# Patient Record
Sex: FEMALE | Race: WHITE | NOT HISPANIC OR LATINO | Employment: UNEMPLOYED | URBAN - METROPOLITAN AREA
[De-identification: names, ages, dates, MRNs, and addresses within clinical notes are randomized per-mention and may not be internally consistent; named-entity substitution may affect disease eponyms.]

---

## 2018-07-31 ENCOUNTER — HOSPITAL ENCOUNTER (EMERGENCY)
Facility: HOSPITAL | Age: 28
Discharge: HOME | End: 2018-07-31
Attending: EMERGENCY MEDICINE | Admitting: EMERGENCY MEDICINE

## 2018-07-31 VITALS
RESPIRATION RATE: 16 BRPM | TEMPERATURE: 98.3 F | HEIGHT: 64 IN | DIASTOLIC BLOOD PRESSURE: 75 MMHG | OXYGEN SATURATION: 98 % | SYSTOLIC BLOOD PRESSURE: 121 MMHG | HEART RATE: 87 BPM | WEIGHT: 149.5 LBS | BODY MASS INDEX: 25.52 KG/M2

## 2018-07-31 DIAGNOSIS — R19.7 NAUSEA VOMITING AND DIARRHEA: Primary | ICD-10-CM

## 2018-07-31 DIAGNOSIS — R11.2 NAUSEA VOMITING AND DIARRHEA: Primary | ICD-10-CM

## 2018-07-31 DIAGNOSIS — E86.0 DEHYDRATION: ICD-10-CM

## 2018-07-31 LAB
ALBUMIN SERPL-MCNC: 4.3 G/DL (ref 3.5–5.2)
ALBUMIN/GLOB SERPL: 1.4 G/DL
ALP SERPL-CCNC: 52 U/L (ref 39–117)
ALT SERPL W P-5'-P-CCNC: 16 U/L (ref 1–33)
ANION GAP SERPL CALCULATED.3IONS-SCNC: 14.9 MMOL/L
AST SERPL-CCNC: 16 U/L (ref 1–32)
BACTERIA UR QL AUTO: ABNORMAL /HPF
BASOPHILS # BLD AUTO: 0.01 10*3/MM3 (ref 0–0.2)
BASOPHILS NFR BLD AUTO: 0.1 % (ref 0–1.5)
BILIRUB SERPL-MCNC: 0.4 MG/DL (ref 0.1–1.2)
BILIRUB UR QL STRIP: NEGATIVE
BUN BLD-MCNC: 11 MG/DL (ref 6–20)
BUN/CREAT SERPL: 11.3 (ref 7–25)
CALCIUM SPEC-SCNC: 8.5 MG/DL (ref 8.6–10.5)
CHLORIDE SERPL-SCNC: 101 MMOL/L (ref 98–107)
CLARITY UR: ABNORMAL
CO2 SERPL-SCNC: 22.1 MMOL/L (ref 22–29)
COLOR UR: ABNORMAL
CREAT BLD-MCNC: 0.97 MG/DL (ref 0.57–1)
D-LACTATE SERPL-SCNC: 1.3 MMOL/L (ref 0.5–2)
DEPRECATED RDW RBC AUTO: 41.2 FL (ref 37–54)
EOSINOPHIL # BLD AUTO: 0 10*3/MM3 (ref 0–0.7)
EOSINOPHIL NFR BLD AUTO: 0 % (ref 0.3–6.2)
ERYTHROCYTE [DISTWIDTH] IN BLOOD BY AUTOMATED COUNT: 13 % (ref 11.7–13)
GFR SERPL CREATININE-BSD FRML MDRD: 69 ML/MIN/1.73
GLOBULIN UR ELPH-MCNC: 3.1 GM/DL
GLUCOSE BLD-MCNC: 118 MG/DL (ref 65–99)
GLUCOSE UR STRIP-MCNC: NEGATIVE MG/DL
HCT VFR BLD AUTO: 44.2 % (ref 35.6–45.5)
HGB BLD-MCNC: 14.3 G/DL (ref 11.9–15.5)
HGB UR QL STRIP.AUTO: ABNORMAL
HYALINE CASTS UR QL AUTO: ABNORMAL /LPF
IMM GRANULOCYTES # BLD: 0.02 10*3/MM3 (ref 0–0.03)
IMM GRANULOCYTES NFR BLD: 0.2 % (ref 0–0.5)
KETONES UR QL STRIP: ABNORMAL
LEUKOCYTE ESTERASE UR QL STRIP.AUTO: ABNORMAL
LYMPHOCYTES # BLD AUTO: 0.8 10*3/MM3 (ref 0.9–4.8)
LYMPHOCYTES NFR BLD AUTO: 9.4 % (ref 19.6–45.3)
MCH RBC QN AUTO: 28.3 PG (ref 26.9–32)
MCHC RBC AUTO-ENTMCNC: 32.4 G/DL (ref 32.4–36.3)
MCV RBC AUTO: 87.4 FL (ref 80.5–98.2)
MONOCYTES # BLD AUTO: 0.21 10*3/MM3 (ref 0.2–1.2)
MONOCYTES NFR BLD AUTO: 2.5 % (ref 5–12)
MUCOUS THREADS URNS QL MICRO: ABNORMAL /HPF
NEUTROPHILS # BLD AUTO: 7.45 10*3/MM3 (ref 1.9–8.1)
NEUTROPHILS NFR BLD AUTO: 87.8 % (ref 42.7–76)
NITRITE UR QL STRIP: NEGATIVE
PH UR STRIP.AUTO: 6.5 [PH] (ref 5–8)
PLATELET # BLD AUTO: 177 10*3/MM3 (ref 140–500)
PMV BLD AUTO: 9.9 FL (ref 6–12)
POTASSIUM BLD-SCNC: 3.9 MMOL/L (ref 3.5–5.2)
PROT SERPL-MCNC: 7.4 G/DL (ref 6–8.5)
PROT UR QL STRIP: ABNORMAL
RBC # BLD AUTO: 5.06 10*6/MM3 (ref 3.9–5.2)
RBC # UR: ABNORMAL /HPF
REF LAB TEST METHOD: ABNORMAL
SODIUM BLD-SCNC: 138 MMOL/L (ref 136–145)
SP GR UR STRIP: >=1.03 (ref 1–1.03)
SQUAMOUS #/AREA URNS HPF: ABNORMAL /HPF
UROBILINOGEN UR QL STRIP: ABNORMAL
WBC NRBC COR # BLD: 8.49 10*3/MM3 (ref 4.5–10.7)
WBC UR QL AUTO: ABNORMAL /HPF

## 2018-07-31 PROCEDURE — 81001 URINALYSIS AUTO W/SCOPE: CPT | Performed by: EMERGENCY MEDICINE

## 2018-07-31 PROCEDURE — 87040 BLOOD CULTURE FOR BACTERIA: CPT | Performed by: EMERGENCY MEDICINE

## 2018-07-31 PROCEDURE — 96360 HYDRATION IV INFUSION INIT: CPT

## 2018-07-31 PROCEDURE — 85025 COMPLETE CBC W/AUTO DIFF WBC: CPT | Performed by: EMERGENCY MEDICINE

## 2018-07-31 PROCEDURE — 99284 EMERGENCY DEPT VISIT MOD MDM: CPT

## 2018-07-31 PROCEDURE — 80053 COMPREHEN METABOLIC PANEL: CPT | Performed by: EMERGENCY MEDICINE

## 2018-07-31 PROCEDURE — 83605 ASSAY OF LACTIC ACID: CPT | Performed by: EMERGENCY MEDICINE

## 2018-07-31 RX ORDER — PROMETHAZINE HYDROCHLORIDE 25 MG/1
25 TABLET ORAL EVERY 6 HOURS PRN
Qty: 20 TABLET | Refills: 0 | Status: SHIPPED | OUTPATIENT
Start: 2018-07-31 | End: 2020-11-06

## 2018-07-31 RX ORDER — NORGESTIMATE AND ETHINYL ESTRADIOL 0.25-0.035
1 KIT ORAL DAILY
COMMUNITY
End: 2020-11-06

## 2018-07-31 RX ORDER — ACETAMINOPHEN, ASPIRIN AND CAFFEINE 250; 250; 65 MG/1; MG/1; MG/1
2 TABLET, FILM COATED ORAL EVERY 6 HOURS PRN
COMMUNITY
End: 2020-11-06

## 2018-07-31 RX ADMIN — SODIUM CHLORIDE 2034 ML: 9 INJECTION, SOLUTION INTRAVENOUS at 04:04

## 2018-08-05 LAB
BACTERIA SPEC AEROBE CULT: NORMAL
BACTERIA SPEC AEROBE CULT: NORMAL

## 2018-08-07 ENCOUNTER — OFFICE VISIT (OUTPATIENT)
Dept: FAMILY MEDICINE CLINIC | Facility: CLINIC | Age: 28
End: 2018-08-07

## 2018-08-07 VITALS
SYSTOLIC BLOOD PRESSURE: 120 MMHG | BODY MASS INDEX: 27.11 KG/M2 | DIASTOLIC BLOOD PRESSURE: 74 MMHG | HEART RATE: 86 BPM | WEIGHT: 158.8 LBS | TEMPERATURE: 98.2 F | HEIGHT: 64 IN | OXYGEN SATURATION: 96 %

## 2018-08-07 DIAGNOSIS — K59.00 CONSTIPATION, UNSPECIFIED CONSTIPATION TYPE: Primary | ICD-10-CM

## 2018-08-07 PROCEDURE — 99204 OFFICE O/P NEW MOD 45 MIN: CPT | Performed by: NURSE PRACTITIONER

## 2018-08-07 RX ORDER — ONDANSETRON 4 MG/1
TABLET, FILM COATED ORAL AS NEEDED
Refills: 0 | COMMUNITY
Start: 2018-07-30 | End: 2020-11-06

## 2018-08-07 NOTE — PROGRESS NOTES
Subjective   Angelica Pinon is a 28 y.o. female.     Chief Complaint   Patient presents with   • Abdominal Pain     NP- intermittent over months   • Nausea   • Diarrhea     x 5 days   • Constipation     After diarrhea is now constipation     Ms Pinon presents today to establish care at this practice and to follow up on a recent ER visit. She was seen at [facility] in the ER on 7/31/18 for diarrhea and vomiting. The vomiting and nausea started 2 days prior to her ER visit and the diarrhea started the day of her ER visit. She also reported a fever at that time. She was treated for viral infection and dehydration and released on promethazine or Zofran for the vomiting. She reports that since that visit she has been constipated. She reports that she has had intermittent abdominal discomfort with diarrhea over the past few months. There is no nausea or vomiting associated with the discomfort and it only last for 1 to 2 days. She states her mother has her concerned because she has a sister with ulcerative colitis since childhood and this has cause her sister many health issues.    I have reviewed the patient's medical history in detail and updated the computerized patient record.     The following portions of the patient's history were reviewed and updated as appropriate: allergies, current medications, past family history, past medical history, past social history, past surgical history and problem list.       Current Outpatient Prescriptions:   •  aspirin-acetaminophen-caffeine (EXCEDRIN MIGRAINE) 250-250-65 MG per tablet, Take 2 tablets by mouth Every 6 (Six) Hours As Needed for Headache., Disp: , Rfl:   •  norgestimate-ethinyl estradiol (ESTARYLLA) 0.25-35 MG-MCG per tablet, Take 1 tablet by mouth Daily., Disp: , Rfl:   •  ondansetron (ZOFRAN) 4 MG tablet, As Needed., Disp: , Rfl: 0  •  promethazine (PHENERGAN) 25 MG tablet, Take 1 tablet by mouth Every 6 (Six) Hours As Needed for Nausea., Disp: 20 tablet, Rfl: 0    Review of  Systems   Constitutional: Negative.    HENT: Negative.    Eyes: Negative.    Respiratory: Negative.    Cardiovascular: Negative.    Gastrointestinal: Positive for abdominal pain (intermittent ), constipation (now is constipated ), diarrhea (last 5 days, last week), nausea (last week) and vomiting (last week). Negative for abdominal distention.   Skin: Negative.    Neurological: Negative.        Objective    Vitals:    08/07/18 0909   BP: 120/74   Pulse: 86   Temp: 98.2 °F (36.8 °C)   SpO2: 96%     Physical Exam   Constitutional: She is oriented to person, place, and time. She appears well-developed and well-nourished.   HENT:   Head: Normocephalic.   Right Ear: External ear normal.   Left Ear: External ear normal.   Mouth/Throat: Oropharynx is clear and moist.   Neck: Normal range of motion. Neck supple. No JVD present. No tracheal deviation present. No thyromegaly present.   Cardiovascular: Normal rate, regular rhythm, normal heart sounds and intact distal pulses.    Pulmonary/Chest: Effort normal and breath sounds normal.   Abdominal: Soft. Normal appearance. She exhibits no distension and no mass. Bowel sounds are decreased. There is no splenomegaly or hepatomegaly. There is no tenderness. There is no rebound, no guarding, no tenderness at McBurney's point and negative Cantu's sign.   Musculoskeletal: Normal range of motion. She exhibits no edema.   Lymphadenopathy:     She has no cervical adenopathy.   Neurological: She is alert and oriented to person, place, and time.   Skin: Skin is warm and dry.   Vitals reviewed.        Assessment/Plan   Angelica was seen today for abdominal pain, nausea, diarrhea and constipation.    Diagnoses and all orders for this visit:    Constipation, unspecified constipation type    1. I have reviewed her ER records and agree.  2. I have discussed with both her and her , who has been present for the exam, that I am not surprised she is constipated. She reports that she mostly  drinks soft drinks and very little water. I also discussed with her that the intermittent abdominal pain/discomfort could be related to constipation. One sign of constipation is loose stools around the hardened stool in her GI tract.   3. We discussed life style changes such as eating foods higher in fiber, especially fruits and vegetables. She also needs to decrease her soft drink intake and drink at least 64 ounces of water daily. Exercise will also help with chronic constipation.  4. We discussed that if her symptoms do not improve I will send her for a colonoscopy.   5. She is to follow up with me as needed and in 6 months for her annual preventive exam.

## 2018-12-04 ENCOUNTER — OFFICE VISIT (OUTPATIENT)
Dept: FAMILY MEDICINE CLINIC | Facility: CLINIC | Age: 28
End: 2018-12-04

## 2018-12-04 VITALS
DIASTOLIC BLOOD PRESSURE: 72 MMHG | WEIGHT: 159.8 LBS | SYSTOLIC BLOOD PRESSURE: 120 MMHG | HEART RATE: 71 BPM | RESPIRATION RATE: 16 BRPM | TEMPERATURE: 98.8 F | OXYGEN SATURATION: 99 % | BODY MASS INDEX: 27.28 KG/M2 | HEIGHT: 64 IN

## 2018-12-04 DIAGNOSIS — R21 RASH: Primary | ICD-10-CM

## 2018-12-04 PROCEDURE — 99213 OFFICE O/P EST LOW 20 MIN: CPT | Performed by: NURSE PRACTITIONER

## 2018-12-04 RX ORDER — TRIAMCINOLONE ACETONIDE 0.25 MG/ML
1 LOTION TOPICAL 2 TIMES DAILY
Qty: 60 ML | Refills: 1 | Status: SHIPPED | OUTPATIENT
Start: 2018-12-04 | End: 2020-11-06

## 2018-12-04 RX ORDER — MULTIPLE VITAMINS W/ MINERALS TAB 9MG-400MCG
1 TAB ORAL DAILY
COMMUNITY
End: 2020-11-06

## 2018-12-04 NOTE — PROGRESS NOTES
Subjective   Angelica Pinon is a 28 y.o. female.     Chief Complaint   Patient presents with   • Eczema     red spots on stomach, back, legs and arms      Rash   This is a new problem. The current episode started 1 to 4 weeks ago (about 2 weeks ago). The problem has been waxing and waning since onset. The affected locations include the abdomen, torso, right arm, left arm, left upper leg and right upper leg. The rash is characterized by itchiness and redness. It is unknown if there was an exposure to a precipitant.      I have reviewed the patient's medical history in detail and updated the computerized patient record.    The following portions of the patient's history were reviewed and updated as appropriate: allergies, current medications, past family history, past medical history, past social history, past surgical history and problem list.       Current Outpatient Medications:   •  aspirin-acetaminophen-caffeine (EXCEDRIN MIGRAINE) 250-250-65 MG per tablet, Take 2 tablets by mouth Every 6 (Six) Hours As Needed for Headache., Disp: , Rfl:   •  Multiple Vitamins-Minerals (MULTIVITAMIN WITH MINERALS) tablet tablet, Take 1 tablet by mouth Daily., Disp: , Rfl:   •  norgestimate-ethinyl estradiol (ESTARYLLA) 0.25-35 MG-MCG per tablet, Take 1 tablet by mouth Daily., Disp: , Rfl:   •  ondansetron (ZOFRAN) 4 MG tablet, As Needed., Disp: , Rfl: 0  •  promethazine (PHENERGAN) 25 MG tablet, Take 1 tablet by mouth Every 6 (Six) Hours As Needed for Nausea., Disp: 20 tablet, Rfl: 0    Review of Systems   Constitutional: Negative.    Respiratory: Negative.    Cardiovascular: Negative.    Skin: Positive for rash (itching).   Allergic/Immunologic: Environmental allergies: allergic to certain laundry soaps.        Vitals:    12/04/18 0850   BP: 120/72   BP Location: Left arm   Patient Position: Sitting   Cuff Size: Adult   Pulse: 71   Resp: 16   Temp: 98.8 °F (37.1 °C)   TempSrc: Oral   SpO2: 99%   Weight: 72.5 kg (159 lb 12.8 oz)  "  Height: 162.6 cm (64.02\")       Objective   Physical Exam   Constitutional: She is oriented to person, place, and time. She appears well-developed and well-nourished.   Neurological: She is alert and oriented to person, place, and time.   Skin: Skin is warm and dry. Rash (She has multiple sites that look like bed bug bites in a pattern of three/four in a line and then scattered) noted.   Psychiatric:   No acute distress   Vitals reviewed.        Assessment/Plan   Angelica was seen today for eczema.    Diagnoses and all orders for this visit:    Rash    Other orders  -     Triamcinolone Acetonide 0.025 % lotion; Apply 1 application topically 2 (Two) Times a Day.    1. The a majority of the rash is located on her back, with areas on both arms, and abdomin.   2. We discussed what bed bug bites look like and the pattern associated with bed bug bites. I discussed with her that most people do not realize they are bitten by a bed bug unless a rash appears. We discussed what she needs to look for when she gets home, and to check all of her furniture.  3. She is to apply Rriamcinolone Acetonide 0.025 % lotion to the affected areas.            "

## 2020-11-06 ENCOUNTER — OFFICE VISIT (OUTPATIENT)
Dept: INTERNAL MEDICINE | Facility: CLINIC | Age: 30
End: 2020-11-06

## 2020-11-06 VITALS
OXYGEN SATURATION: 99 % | SYSTOLIC BLOOD PRESSURE: 128 MMHG | TEMPERATURE: 97.5 F | DIASTOLIC BLOOD PRESSURE: 86 MMHG | WEIGHT: 162.6 LBS | RESPIRATION RATE: 16 BRPM | HEIGHT: 66 IN | BODY MASS INDEX: 26.13 KG/M2 | HEART RATE: 98 BPM

## 2020-11-06 DIAGNOSIS — Z3A.01 LESS THAN 8 WEEKS GESTATION OF PREGNANCY: ICD-10-CM

## 2020-11-06 DIAGNOSIS — Z00.00 ENCOUNTER FOR WELLNESS EXAMINATION IN ADULT: Primary | ICD-10-CM

## 2020-11-06 PROCEDURE — 99395 PREV VISIT EST AGE 18-39: CPT | Performed by: NURSE PRACTITIONER

## 2020-11-06 NOTE — PROGRESS NOTES
NATHAN Dominguez is a 30 y.o. female presenting for Establish Care (? 6 weeks pregnant )    Her current/chronic health conditions include:  There is no problem list on file for this patient.      Current Outpatient Medications on File Prior to Visit   Medication Sig   • [DISCONTINUED] aspirin-acetaminophen-caffeine (EXCEDRIN MIGRAINE) 250-250-65 MG per tablet Take 2 tablets by mouth Every 6 (Six) Hours As Needed for Headache.   • [DISCONTINUED] Multiple Vitamins-Minerals (MULTIVITAMIN WITH MINERALS) tablet tablet Take 1 tablet by mouth Daily.   • [DISCONTINUED] norgestimate-ethinyl estradiol (ESTARYLLA) 0.25-35 MG-MCG per tablet Take 1 tablet by mouth Daily.   • [DISCONTINUED] ondansetron (ZOFRAN) 4 MG tablet As Needed.   • [DISCONTINUED] promethazine (PHENERGAN) 25 MG tablet Take 1 tablet by mouth Every 6 (Six) Hours As Needed for Nausea.   • [DISCONTINUED] Triamcinolone Acetonide 0.025 % lotion Apply 1 application topically 2 (Two) Times a Day.     No current facility-administered medications on file prior to visit.      LMP 09/27/2020. This was a nml period and pt had been having nml monthly periods prior to that. She had a pos home pregnancy. She is feeling very emotional. Denies nausea, breast tenderness. Denies VB or cramping. This was a planned pregnancy.      Health Habits:  Dental Exam: UTD  Eye Exam: NUTD  Exercise: 7 times/week.  Current exercise activities include: walking    Screenings:  Last pap date: she is UTD w/ yearly GYN care  Mammogram: n/a  Dexa: n/a  Colonoscopy: n/a  Tob use: never      The following portions of the patient's history were reviewed and updated as appropriate: allergies, current medications, problem list, past medical history, past family history, past medical history, and past social history.    Review of Systems   Constitutional: Negative.    HENT: Negative.    Eyes: Negative.    Respiratory: Negative.    Cardiovascular: Negative.    Gastrointestinal: Negative.   "  Endocrine: Negative.    Genitourinary:        Missed period   Musculoskeletal: Negative.    Skin: Negative.    Allergic/Immunologic: Negative.    Neurological: Negative.    Hematological: Negative.    Psychiatric/Behavioral: Negative.        OBJECTIVE    /86 (BP Location: Left arm, Patient Position: Sitting, Cuff Size: Adult)   Pulse 98   Temp 97.5 °F (36.4 °C) (Temporal)   Resp 16   Ht 167.6 cm (66\")   Wt 73.8 kg (162 lb 9.6 oz)   SpO2 99%   BMI 26.24 kg/m²   Body mass index is 26.24 kg/m².  Nursing notes and vital signs reviewed.    Physical Exam  Constitutional:       General: She is not in acute distress.     Appearance: Normal appearance. She is well-developed.   HENT:      Head: Normocephalic.      Right Ear: Hearing, tympanic membrane, ear canal and external ear normal.      Left Ear: Hearing, tympanic membrane, ear canal and external ear normal.      Nose: Nose normal. No mucosal edema or rhinorrhea.      Mouth/Throat:      Mouth: Mucous membranes are moist.      Pharynx: Oropharynx is clear. Uvula midline.   Eyes:      General: Lids are normal.      Extraocular Movements: Extraocular movements intact.      Conjunctiva/sclera: Conjunctivae normal.      Pupils: Pupils are equal, round, and reactive to light.   Neck:      Musculoskeletal: Normal range of motion and neck supple.      Thyroid: No thyroid mass or thyromegaly.   Cardiovascular:      Rate and Rhythm: Regular rhythm.      Pulses: Normal pulses.      Heart sounds: S1 normal and S2 normal. No murmur. No friction rub. No gallop.    Pulmonary:      Effort: Pulmonary effort is normal.      Breath sounds: Normal breath sounds. No wheezing, rhonchi or rales.   Abdominal:      General: Bowel sounds are normal.      Palpations: Abdomen is soft.      Tenderness: There is no abdominal tenderness. There is no guarding.      Hernia: No hernia is present.   Musculoskeletal: Normal range of motion.         General: No deformity.   Lymphadenopathy: "      Cervical: No cervical adenopathy.   Skin:     General: Skin is warm and dry.      Findings: No lesion or rash.   Neurological:      General: No focal deficit present.      Mental Status: She is alert and oriented to person, place, and time.      Cranial Nerves: No cranial nerve deficit.      Sensory: No sensory deficit.      Motor: Motor function is intact.      Coordination: Coordination is intact.      Gait: Gait normal.      Deep Tendon Reflexes: Reflexes are normal and symmetric.   Psychiatric:         Attention and Perception: She is attentive.         Mood and Affect: Mood and affect normal.         Speech: Speech normal.         Behavior: Behavior normal.         Thought Content: Thought content normal.         No results found for this or any previous visit (from the past 672 hour(s)).      ASSESSMENT AND PLAN    Diagnoses and all orders for this visit:    1. Encounter for wellness examination in adult (Primary)    2. Less than 8 weeks gestation of pregnancy  -     Ambulatory Referral to Gynecology        Preventative counseling completed including relevant screenings, appropriate vaccinations, healthy nutrition, and appropriate physical activity.        Medications, including side effects, were discussed with the patient. Patient verbalized understanding.  The plan of care was discussed. All questions were answered. Patient verbalized understanding.        F/U as needed.

## 2021-09-17 ENCOUNTER — OFFICE VISIT (OUTPATIENT)
Dept: INTERNAL MEDICINE | Facility: CLINIC | Age: 31
End: 2021-09-17

## 2021-09-17 VITALS
RESPIRATION RATE: 19 BRPM | TEMPERATURE: 97.6 F | OXYGEN SATURATION: 98 % | BODY MASS INDEX: 25.55 KG/M2 | WEIGHT: 159 LBS | HEIGHT: 66 IN | DIASTOLIC BLOOD PRESSURE: 80 MMHG | SYSTOLIC BLOOD PRESSURE: 118 MMHG | HEART RATE: 87 BPM

## 2021-09-17 DIAGNOSIS — F41.9 ANXIETY: ICD-10-CM

## 2021-09-17 DIAGNOSIS — R00.2 PALPITATIONS: Primary | ICD-10-CM

## 2021-09-17 PROCEDURE — 99214 OFFICE O/P EST MOD 30 MIN: CPT | Performed by: INTERNAL MEDICINE

## 2021-09-17 PROCEDURE — 93000 ELECTROCARDIOGRAM COMPLETE: CPT | Performed by: INTERNAL MEDICINE

## 2021-09-17 RX ORDER — ACETAMINOPHEN AND CODEINE PHOSPHATE 120; 12 MG/5ML; MG/5ML
0.35 SOLUTION ORAL DAILY
COMMUNITY
Start: 2021-08-03 | End: 2022-08-02

## 2021-09-17 NOTE — PROGRESS NOTES
Angelica Pinon is a 31 y.o. female, who presents with a chief complaint of   Chief Complaint   Patient presents with   • Headache     since saturday   • Palpitations     possibly stress related           HPI   Pt here bc of palpitations.  she is a new pt to me.  Sx began about a week ago.  She doesn't know if the palpitations are related to stress or something else.  Sx most noticeable at rest.  No issues with activity.  No chest pain or soa.  She had a baby in  and this is her first week back to work.  Baby is home with dad.  She also is having some headache flashes.  + hx migraines but this is different.  Sx just last for a second then go away. No vision changes.  No n/v.  She is up a lot at night but she has a  at home.  She is nursing.  She says she has always been a very anxious person.  Pt is tearful while providing history.  + fam hx anxiety.        The following portions of the patient's history were reviewed and updated as appropriate: allergies, current medications, past family history, past medical history, past social history, past surgical history and problem list.    Allergies: Patient has no known allergies.    Review of Systems   Constitutional: Negative.    HENT: Negative.    Eyes: Negative.    Respiratory: Negative.    Cardiovascular: Positive for palpitations.   Gastrointestinal: Negative.    Endocrine: Negative.    Genitourinary: Negative.    Musculoskeletal: Negative.    Skin: Negative.    Allergic/Immunologic: Negative.    Neurological: Negative.    Hematological: Negative.    Psychiatric/Behavioral: Negative for self-injury and suicidal ideas. The patient is nervous/anxious.    All other systems reviewed and are negative.            Wt Readings from Last 3 Encounters:   21 72.1 kg (159 lb)   20 73.8 kg (162 lb 9.6 oz)   18 72.5 kg (159 lb 12.8 oz)     Temp Readings from Last 3 Encounters:   21 97.6 °F (36.4 °C)   20 97.5 °F (36.4 °C) (Temporal)    12/04/18 98.8 °F (37.1 °C) (Oral)     BP Readings from Last 3 Encounters:   09/17/21 118/80   11/06/20 128/86   12/04/18 120/72     Pulse Readings from Last 3 Encounters:   09/17/21 87   11/06/20 98   12/04/18 71     Body mass index is 25.64 kg/m².  SpO2 Readings from Last 3 Encounters:   09/17/21 98%   11/06/20 99%   12/04/18 99%            Physical Exam  Vitals and nursing note reviewed.   Constitutional:       General: She is not in acute distress.     Appearance: She is well-developed.   HENT:      Head: Normocephalic and atraumatic.      Right Ear: External ear normal.      Left Ear: External ear normal.      Nose: Nose normal.   Eyes:      Conjunctiva/sclera: Conjunctivae normal.      Pupils: Pupils are equal, round, and reactive to light.   Cardiovascular:      Rate and Rhythm: Normal rate and regular rhythm.      Heart sounds: Normal heart sounds.   Pulmonary:      Effort: Pulmonary effort is normal. No respiratory distress.      Breath sounds: Normal breath sounds. No wheezing.   Musculoskeletal:         General: Normal range of motion.      Cervical back: Normal range of motion and neck supple.      Comments: Normal gait   Skin:     General: Skin is warm and dry.   Neurological:      Mental Status: She is alert and oriented to person, place, and time.   Psychiatric:         Behavior: Behavior normal.         Thought Content: Thought content normal.         Judgment: Judgment normal.         Results for orders placed or performed during the hospital encounter of 07/31/18   Blood Culture - Blood,    Specimen: Arm, Left; Blood   Result Value Ref Range    Blood Culture No growth at 5 days    Blood Culture - Blood,    Specimen: Arm, Right; Blood   Result Value Ref Range    Blood Culture No growth at 5 days    Comprehensive Metabolic Panel    Specimen: Arm, Left; Blood   Result Value Ref Range    Glucose 118 (H) 65 - 99 mg/dL    BUN 11 6 - 20 mg/dL    Creatinine 0.97 0.57 - 1.00 mg/dL    Sodium 138 136 - 145  mmol/L    Potassium 3.9 3.5 - 5.2 mmol/L    Chloride 101 98 - 107 mmol/L    CO2 22.1 22.0 - 29.0 mmol/L    Calcium 8.5 (L) 8.6 - 10.5 mg/dL    Total Protein 7.4 6.0 - 8.5 g/dL    Albumin 4.30 3.50 - 5.20 g/dL    ALT (SGPT) 16 1 - 33 U/L    AST (SGOT) 16 1 - 32 U/L    Alkaline Phosphatase 52 39 - 117 U/L    Total Bilirubin 0.4 0.1 - 1.2 mg/dL    eGFR Non African Amer 69 >60 mL/min/1.73    Globulin 3.1 gm/dL    A/G Ratio 1.4 g/dL    BUN/Creatinine Ratio 11.3 7.0 - 25.0    Anion Gap 14.9 mmol/L   Urinalysis With Microscopic If Indicated (No Culture) - Urine, Clean Catch    Specimen: Urine, Clean Catch   Result Value Ref Range    Color, UA Dark Yellow (A) Yellow, Straw    Appearance, UA Cloudy (A) Clear    pH, UA 6.5 5.0 - 8.0    Specific Gravity, UA >=1.030 1.005 - 1.030    Glucose, UA Negative Negative    Ketones, UA 40 mg/dL (2+) (A) Negative    Bilirubin, UA Negative Negative    Blood, UA Large (3+) (A) Negative    Protein,  mg/dL (2+) (A) Negative    Leuk Esterase, UA Small (1+) (A) Negative    Nitrite, UA Negative Negative    Urobilinogen, UA 1.0 E.U./dL 0.2 - 1.0 E.U./dL   Lactic Acid, Plasma    Specimen: Arm, Right; Blood   Result Value Ref Range    Lactate 1.3 0.5 - 2.0 mmol/L   CBC Auto Differential    Specimen: Arm, Left; Blood   Result Value Ref Range    WBC 8.49 4.50 - 10.70 10*3/mm3    RBC 5.06 3.90 - 5.20 10*6/mm3    Hemoglobin 14.3 11.9 - 15.5 g/dL    Hematocrit 44.2 35.6 - 45.5 %    MCV 87.4 80.5 - 98.2 fL    MCH 28.3 26.9 - 32.0 pg    MCHC 32.4 32.4 - 36.3 g/dL    RDW 13.0 11.7 - 13.0 %    RDW-SD 41.2 37.0 - 54.0 fl    MPV 9.9 6.0 - 12.0 fL    Platelets 177 140 - 500 10*3/mm3    Neutrophil % 87.8 (H) 42.7 - 76.0 %    Lymphocyte % 9.4 (L) 19.6 - 45.3 %    Monocyte % 2.5 (L) 5.0 - 12.0 %    Eosinophil % 0.0 (L) 0.3 - 6.2 %    Basophil % 0.1 0.0 - 1.5 %    Immature Grans % 0.2 0.0 - 0.5 %    Neutrophils, Absolute 7.45 1.90 - 8.10 10*3/mm3    Lymphocytes, Absolute 0.80 (L) 0.90 - 4.80 10*3/mm3     Monocytes, Absolute 0.21 0.20 - 1.20 10*3/mm3    Eosinophils, Absolute 0.00 0.00 - 0.70 10*3/mm3    Basophils, Absolute 0.01 0.00 - 0.20 10*3/mm3    Immature Grans, Absolute 0.02 0.00 - 0.03 10*3/mm3   Urinalysis, Microscopic Only - Urine, Clean Catch    Specimen: Urine, Clean Catch   Result Value Ref Range    RBC, UA 21-30 (A) None Seen, 0-2 /HPF    WBC, UA 31-50 (A) None Seen, 0-2 /HPF    Bacteria, UA 4+ (A) None Seen /HPF    Squamous Epithelial Cells, UA 13-20 (A) None Seen, 0-2 /HPF    Hyaline Casts, UA 0-2 None Seen /LPF    Mucus, UA Moderate/2+ (A) None Seen, Trace /HPF    Methodology Manual Light Microscopy      Result Review :                ECG 12 Lead    Date/Time: 9/17/2021 5:13 PM  Performed by: Chayo Cool MD  Authorized by: Chayo Cool MD                 Assessment and Plan    Diagnoses and all orders for this visit:    1. Palpitations (Primary) - no red flag sx at this time.  Bp, hr, and ecg reassuring.  Pt to keep log of palpitations and headache flash sx and f/u in 2 weeks for recheck  -     ECG 12 Lead    2. Anxiety  -     Ambulatory Referral to Behavioral Health     pt doesn't want to start meds at this time but open to counseling.  No si/hi and pt contracts for safety. Other counseling and crisis numbers given.            Outpatient Medications Prior to Visit   Medication Sig Dispense Refill   • norethindrone (MICRONOR) 0.35 MG tablet Take 0.35 mg by mouth Daily.       No facility-administered medications prior to visit.     No orders of the defined types were placed in this encounter.    [unfilled]  There are no discontinued medications.      Return in about 2 weeks (around 10/1/2021) for Recheck.    Patient was given instructions and counseling regarding her condition or for health maintenance advice. Please see specific information pulled into the AVS if appropriate.

## 2021-09-17 NOTE — PATIENT INSTRUCTIONS
"Mental Health and Counseling Services:      [facility]:  [phone]; [email or web address]    [facility]:  [phone]    Chayo Rodríguez:  [phone]    Siena Dutta:  [phone]    Mary Miller:  [phone]    [facility]:  [phone]    [facility]:  [phone]    Counseling, Therapy and More, [phone]    [phone]    Yasmine Stacy:  [phone]    [phone]    [phone]    Corrie Rodriguez:  [phone]    [facility]:  [phone]    [facility]:  [phone]    [phone]    [phone]    [phone]  [email or web address]   Generalized Anxiety Disorder, Adult  Generalized anxiety disorder (AQUILES) is a mental health condition. Unlike normal worries, anxiety related to AQUILES is not triggered by a specific event. These worries do not fade or get better with time. AQUILES interferes with relationships, work, and school.  AQUILES symptoms can vary from mild to severe. People with severe AQUILES can have intense waves of anxiety with physical symptoms that are similar to panic attacks.  What are the causes?  The exact cause of AQUILES is not known, but the following are believed to have an impact:  · Differences in natural brain chemicals.  · Genes passed down from parents to children.  · Differences in the way threats are perceived.  · Development during childhood.  · Personality.  What increases the risk?  The following factors may make you more likely to develop this condition:  · Being female.  · Having a family history of anxiety disorders.  · Being very shy.  · Experiencing very stressful life events, such as the death of a loved one.  · Having a very stressful family environment.  What are the signs or symptoms?  People with AQUILES often worry " excessively about many things in their lives, such as their health and family. Symptoms may also include:  · Mental and emotional symptoms:  ? Worrying excessively about natural disasters.  ? Fear of being late.  ? Difficulty concentrating.  ? Fears that others are judging your performance.  · Physical symptoms:  ? Fatigue.  ? Headaches, muscle tension, muscle twitches, trembling, or feeling shaky.  ? Feeling like your heart is pounding or beating very fast.  ? Feeling out of breath or like you cannot take a deep breath.  ? Having trouble falling asleep or staying asleep, or experiencing restlessness.  ? Sweating.  ? Nausea, diarrhea, or irritable bowel syndrome (IBS).  · Behavioral symptoms:  ? Experiencing erratic moods or irritability.  ? Avoidance of new situations.  ? Avoidance of people.  ? Extreme difficulty making decisions.  How is this diagnosed?  This condition is diagnosed based on your symptoms and medical history. You will also have a physical exam. Your health care provider may perform tests to rule out other possible causes of your symptoms.  To be diagnosed with AQUILES, a person must have anxiety that:  · Is out of his or her control.  · Affects several different aspects of his or her life, such as work and relationships.  · Causes distress that makes him or her unable to take part in normal activities.  · Includes at least three symptoms of AQUILES, such as restlessness, fatigue, trouble concentrating, irritability, muscle tension, or sleep problems.  Before your health care provider can confirm a diagnosis of AQUILES, these symptoms must be present more days than they are not, and they must last for 6 months or longer.  How is this treated?  This condition may be treated with:  · Medicine. Antidepressant medicine is usually prescribed for long-term daily control. Anti-anxiety medicines may be added in severe cases, especially when panic attacks occur.  · Talk therapy (psychotherapy). Certain types of talk  therapy can be helpful in treating AQUILES by providing support, education, and guidance. Options include:  ? Cognitive behavioral therapy (CBT). People learn coping skills and self-calming techniques to ease their physical symptoms. They learn to identify unrealistic thoughts and behaviors and to replace them with more appropriate thoughts and behaviors.  ? Acceptance and commitment therapy (ACT). This treatment teaches people how to be mindful as a way to cope with unwanted thoughts and feelings.  ? Biofeedback. This process trains you to manage your body's response (physiological response) through breathing techniques and relaxation methods. You will work with a therapist while machines are used to monitor your physical symptoms.  · Stress management techniques. These include yoga, meditation, and exercise.  A mental health specialist can help determine which treatment is best for you. Some people see improvement with one type of therapy. However, other people require a combination of therapies.  Follow these instructions at home:  Lifestyle  · Maintain a consistent routine and schedule.  · Anticipate stressful situations. Create a plan, and allow extra time to work with your plan.  · Practice stress management or self-calming techniques that you have learned from your therapist or your health care provider.  General instructions  · Take over-the-counter and prescription medicines only as told by your health care provider.  · Understand that you are likely to have setbacks. Accept this and be kind to yourself as you persist to take better care of yourself.  · Recognize and accept your accomplishments, even if you  them as small.  · Keep all follow-up visits as told by your health care provider. This is important.  Contact a health care provider if:  · Your symptoms do not get better.  · Your symptoms get worse.  · You have signs of depression, such as:  ? A persistently sad or irritable mood.  ? Loss of  enjoyment in activities that used to bring you sheela.  ? Change in weight or eating.  ? Changes in sleeping habits.  ? Avoiding friends or family members.  ? Loss of energy for normal tasks.  ? Feelings of guilt or worthlessness.  Get help right away if:  · You have serious thoughts about hurting yourself or others.  If you ever feel like you may hurt yourself or others, or have thoughts about taking your own life, get help right away. Go to your nearest emergency department or:  · Call your local emergency services (231 in the [place]  · Call a suicide crisis helpline, such as the National Suicide Prevention Lifeline at 1-106.917.3539. This is open 24 hours a day in the [place]  · Text the Crisis Text Line at 539577 (in the [place]  Summary  · Generalized anxiety disorder (AQUILES) is a mental health condition that involves worry that is not triggered by a specific event.  · People with AQUILES often worry excessively about many things in their lives, such as their health and family.  · AQUILES may cause symptoms such as restlessness, trouble concentrating, sleep problems, frequent sweating, nausea, diarrhea, headaches, and trembling or muscle twitching.  · A mental health specialist can help determine which treatment is best for you. Some people see improvement with one type of therapy. However, other people require a combination of therapies.  This information is not intended to replace advice given to you by your health care provider. Make sure you discuss any questions you have with your health care provider.  Document Revised: 10/07/2020 Document Reviewed: 10/07/2020  Elsevier Patient Education © 2021 [employer]

## 2022-08-02 ENCOUNTER — PATIENT ROUNDING (BHMG ONLY) (OUTPATIENT)
Dept: OBSTETRICS AND GYNECOLOGY | Age: 32
End: 2022-08-02

## 2022-08-02 ENCOUNTER — OFFICE VISIT (OUTPATIENT)
Dept: OBSTETRICS AND GYNECOLOGY | Age: 32
End: 2022-08-02

## 2022-08-02 VITALS
HEIGHT: 67 IN | SYSTOLIC BLOOD PRESSURE: 116 MMHG | WEIGHT: 154.2 LBS | BODY MASS INDEX: 24.2 KG/M2 | DIASTOLIC BLOOD PRESSURE: 68 MMHG

## 2022-08-02 DIAGNOSIS — Z11.51 SCREENING FOR HUMAN PAPILLOMAVIRUS: ICD-10-CM

## 2022-08-02 DIAGNOSIS — Z01.419 ENCOUNTER FOR GYNECOLOGICAL EXAMINATION WITHOUT ABNORMAL FINDING: Primary | ICD-10-CM

## 2022-08-02 PROCEDURE — 99385 PREV VISIT NEW AGE 18-39: CPT | Performed by: OBSTETRICS & GYNECOLOGY

## 2022-08-02 RX ORDER — LEVONORGESTREL AND ETHINYL ESTRADIOL 0.1-0.02MG
1 KIT ORAL DAILY
Qty: 84 TABLET | Refills: 3 | Status: SHIPPED | OUTPATIENT
Start: 2022-08-02 | End: 2023-08-02

## 2022-08-02 NOTE — PROGRESS NOTES
Routine Annual Visit    2022    Patient: Angelica Pinon          MR#:[id number]      Chief Complaint   Patient presents with   • Establish Care   • Gynecologic Exam     New Pt, Last AE over 1 yr.        History of Present Illness    32 y.o. female  who presents for annual exam.  She is overall doing well.  After her pregnancy, she was started on Micronor for contraception.  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  Obstetric History:  OB History        1    Para   1    Term   1            AB        Living   1       SAB        IAB        Ectopic        Molar        Multiple        Live Births   1               Menstrual History:     Patient's last menstrual period was 2022 (approximate).       ________________________________________  There is no problem list on file for this patient.      Past Medical History:   Diagnosis Date   • Asthma    • Didelphic uterus    • Headache    • Intrauterine growth restriction (IUGR) affecting care of mother        Family History   Problem Relation Age of Onset   • No Known Problems Father    • No Known Problems Mother    • No Known Problems Brother    • Ulcerative colitis Sister    • Seizures Sister    • No Known Problems Maternal Grandmother    • Cancer Maternal Grandfather    • Breast cancer Neg Hx    • Ovarian cancer Neg Hx    • Uterine cancer Neg Hx    • Colon cancer Neg Hx        Past Surgical History:   Procedure Laterality Date   •  SECTION     • WISDOM TOOTH EXTRACTION         Social History     Tobacco Use   Smoking Status Never Smoker   Smokeless Tobacco Never Used       has a current medication list which includes the following prescription(s):  "levonorgestrel-ethinyl estradiol.  ________________________________________      Review of Systems   Constitutional: Negative for fever and unexpected weight change.   Respiratory: Negative for shortness of breath.    Cardiovascular: Negative for chest pain.   Gastrointestinal: Negative for abdominal pain, constipation and diarrhea.   Genitourinary: Negative for frequency and urgency.   Hematological: Negative for adenopathy.   Psychiatric/Behavioral: Negative for dysphoric mood.       Objective   Physical Exam    /68   Ht 170.2 cm (67\")   Wt 69.9 kg (154 lb 3.2 oz)   LMP 07/28/2022 (Approximate)   Breastfeeding Yes   BMI 24.15 kg/m²    BP Readings from Last 3 Encounters:   08/02/22 116/68   01/19/22 97/66   09/17/21 118/80      Wt Readings from Last 3 Encounters:   08/02/22 69.9 kg (154 lb 3.2 oz)   01/19/22 72.6 kg (160 lb)   09/17/21 72.1 kg (159 lb)         BMI: Body mass index is 24.15 kg/m².       General:   alert, appears stated age and cooperative   Neck: No thyromegaly or LAD   Heart:: regular rate and rhythm, S1, S2 normal, no murmur, click, rub or gallop   Lungs: normal respiratory effort and auscultation   Abdomen: soft, non-tender, without masses or organomegaly   Breast: inspection negative, no nipple discharge or bleeding, no masses or nodularity palpable   Urethra and bladder: urethral meatus normal; bladder nontender to palpation;   Vulva: normal, Bartholin's, Urethra, [place]'s normal   Vagina: There is a vaginal septum present.  The patient's right side of the vagina is larger and there is a septum extending down approximately one third the length of the vagina.  A smaller cervix is seen on the left   Cervix: Both cervix is visualized, the right is larger.  The left is smaller.  Nulliparous appearing and no lesions   Uterus: normal size and anteverted   Adnexa: normal adnexa and no mass, fullness, tenderness       Assessment:    normal annual exam   Didelphic uterus "   contraception    Plan:    Plan     []  Mammogram request made  [x]  PAP done  []  Labs:   []  GC/Chl/TV  []  DEXA scan   []  Referral for colonoscopy:     Desires oral contraceptive, discussed switching from Micronor to combined as has better efficacy    Counseling  [x]  Nutrition  [x]  Physical activity/regular exercise   [x]  Healthy weight  []  Injury prevention  []  Smoking cessation  []  Substance misuse/abuse  [x]  Sexual behavior  []  STD prevention  [x]  Contraception  []  Dental health  []  Mental health  []  Immunization  [x]  Encouraged SBE          Radha Tristan MD  08/02/2022  11:34 EDT

## 2022-08-02 NOTE — PROGRESS NOTES
A MY CHART MESSAGE HAS BEEN SENT TO THE PATIENT FOR [facility].

## 2022-08-05 LAB
CYTOLOGIST CVX/VAG CYTO: NORMAL
CYTOLOGY CVX/VAG DOC CYTO: NORMAL
CYTOLOGY CVX/VAG DOC THIN PREP: NORMAL
DX ICD CODE: NORMAL
HIV 1 & 2 AB SER-IMP: NORMAL
HPV I/H RISK 4 DNA CVX QL PROBE+SIG AMP: NEGATIVE
OTHER STN SPEC: NORMAL
STAT OF ADQ CVX/VAG CYTO-IMP: NORMAL

## 2023-05-09 ENCOUNTER — OFFICE VISIT (OUTPATIENT)
Dept: FAMILY MEDICINE CLINIC | Facility: CLINIC | Age: 33
End: 2023-05-09
Payer: COMMERCIAL

## 2023-05-09 VITALS
HEART RATE: 115 BPM | HEIGHT: 67 IN | OXYGEN SATURATION: 98 % | SYSTOLIC BLOOD PRESSURE: 130 MMHG | WEIGHT: 168.2 LBS | RESPIRATION RATE: 16 BRPM | DIASTOLIC BLOOD PRESSURE: 82 MMHG | TEMPERATURE: 97.8 F | BODY MASS INDEX: 26.4 KG/M2

## 2023-05-09 DIAGNOSIS — F33.2 SEVERE EPISODE OF RECURRENT MAJOR DEPRESSIVE DISORDER, WITHOUT PSYCHOTIC FEATURES: Primary | ICD-10-CM

## 2023-05-09 DIAGNOSIS — E55.9 VITAMIN D DEFICIENCY: ICD-10-CM

## 2023-05-09 RX ORDER — ERGOCALCIFEROL 1.25 MG/1
50000 CAPSULE ORAL WEEKLY
Qty: 5 CAPSULE | Refills: 0 | Status: SHIPPED | OUTPATIENT
Start: 2023-05-09

## 2023-05-09 RX ORDER — SERTRALINE HYDROCHLORIDE 25 MG/1
25 TABLET, FILM COATED ORAL DAILY
Qty: 30 TABLET | Refills: 0 | Status: SHIPPED | OUTPATIENT
Start: 2023-05-09

## 2023-05-09 NOTE — PROGRESS NOTES
"    Pavel Fagan DO  [facility]  [address]  [place] [address] [phone]    Subjective      Name Angelica Pinon MRN [id number]    1990 AGE/SEX 33 y.o. / female      Chief Complaint Chief Complaint   Patient presents with   • Establish Care     Pt reports concerns about fatigue and depression/sadness         Visit History for  2023    History of Present Illness  Angelica Pinon is a 33 y.o. female who presented today for Establish Care (Pt reports concerns about fatigue and depression/sadness)    Has had issues for a long time and when she was younger she had issues, but didn't feel she could talk about it.  Also has issues with anxiety as well.  Doing basic day to day things has become very hard.  Does not want to take medication, but she is just miserable.      She has a good  that listens to her.      Has a two year old at home as well.   Mother has vitamin D issues.      Has not done much for mental health. Has not been on medication.  Has done some counseling in college.        Medications and Allergies   Current Outpatient Medications   Medication Instructions   • levonorgestrel-ethinyl estradiol (AVIANE,ALESSE,LESSINA) 0.1-20 MG-MCG per tablet 1 tablet, Oral, Daily   • sertraline (ZOLOFT) 25 mg, Oral, Daily   • vitamin D (ERGOCALCIFEROL) 50,000 Units, Oral, Weekly     No Known Allergies   I have reviewed the above medications and allergies     Objective:      Vitals Vitals:    23 1314   BP: 130/82   BP Location: Right arm   Patient Position: Sitting   Pulse: 115   Resp: 16   Temp: 97.8 °F (36.6 °C)   TempSrc: Oral   SpO2: 98%   Weight: 76.3 kg (168 lb 3.2 oz)   Height: 170.2 cm (67\")     Body mass index is 26.34 kg/m².    Physical Exam  Vitals reviewed.   Constitutional:       General: She is not in acute distress.     Appearance: She is not ill-appearing.   Cardiovascular:      Rate and Rhythm: Normal rate and regular rhythm. "   Pulmonary:      Effort: Pulmonary effort is normal.      Breath sounds: Normal breath sounds.   Neurological:      Mental Status: She is alert.   Psychiatric:         Mood and Affect: Mood normal.         Behavior: Behavior normal.         Thought Content: Thought content normal.         Judgment: Judgment normal.     PHQ-9 Depression Screening  Little interest or pleasure in doing things? 3-->nearly every day   Feeling down, depressed, or hopeless? 3-->nearly every day   Trouble falling or staying asleep, or sleeping too much? 2-->more than half the days   Feeling tired or having little energy? 3-->nearly every day   Poor appetite or overeating? 1-->several days   Feeling bad about yourself - or that you are a failure or have let yourself or your family down? 1-->several days   Trouble concentrating on things, such as reading the newspaper or watching television? 2-->more than half the days   Moving or speaking so slowly that other people could have noticed? Or the opposite - being so fidgety or restless that you have been moving around a lot more than usual? 0-->not at all   Thoughts that you would be better off dead, or of hurting yourself in some way? 0-->not at all   PHQ-9 Total Score 15   If you checked off any problems, how difficult have these problems made it for you to do your work, take care of things at home, or get along with other people? very difficult            Assessment/Plan      Issues Addressed/ Plan  1. Severe episode of recurrent major depressive disorder, without psychotic features  - Elevated PHQ-9 score today.  Patient needs to continue with some form of therapy.  We discussed the use of mindfulness and acceptance training.  Also utilizing apps that help with meditation and self reflection.  - Going to start sertraline due to low side effect profile and effectiveness towards major depression.  Most likely will need to increase dose in the future.  May also want to consider checking  hormone disorders as well.  - sertraline (ZOLOFT) 25 MG tablet; Take 1 tablet by mouth Daily.  Dispense: 30 tablet; Refill: 0    2. Vitamin D deficiency  - Patient family history of low vitamin D.  Not currently on any supplementation.  Has been noted to be decreased in the past.  - vitamin D (ERGOCALCIFEROL) 1.25 MG (34315 UT) capsule capsule; Take 1 capsule by mouth 1 (One) Time Per Week.  Dispense: 5 capsule; Refill: 0     Patient Instructions   Get out of your mind and into your life.          Follow up  recommended Return in about 3 weeks (around 5/30/2023) for Depression.   - Dragon voice recognition software was utilized to complete this chart.  Every reasonable attempt was made to edit and correct the text, however some incorrect words may remain.   Pavel Fagan, DO

## 2023-05-30 ENCOUNTER — OFFICE VISIT (OUTPATIENT)
Dept: FAMILY MEDICINE CLINIC | Facility: CLINIC | Age: 33
End: 2023-05-30
Payer: COMMERCIAL

## 2023-05-30 VITALS
HEIGHT: 67 IN | BODY MASS INDEX: 27 KG/M2 | HEART RATE: 93 BPM | DIASTOLIC BLOOD PRESSURE: 84 MMHG | WEIGHT: 172 LBS | SYSTOLIC BLOOD PRESSURE: 130 MMHG | OXYGEN SATURATION: 99 % | TEMPERATURE: 98 F

## 2023-05-30 DIAGNOSIS — F33.2 SEVERE EPISODE OF RECURRENT MAJOR DEPRESSIVE DISORDER, WITHOUT PSYCHOTIC FEATURES: ICD-10-CM

## 2023-05-30 PROCEDURE — 99213 OFFICE O/P EST LOW 20 MIN: CPT | Performed by: STUDENT IN AN ORGANIZED HEALTH CARE EDUCATION/TRAINING PROGRAM

## 2023-05-30 NOTE — PROGRESS NOTES
"    Pavel Fagan DO  [facility]  [address]  [place] [id number] [phone]    Subjective      Name Angelica Pinon MRN [id number]    1990 AGE/SEX 33 y.o. / female      Chief Complaint Chief Complaint   Patient presents with    Depression         Visit History for  2023    History of Present Illness  Angelica Pinon is a 33 y.o. female who presented today for Depression    Feels like medication has improved mood some and she can tell a little bit of a difference.  Did have some headaches with medicaiton, but nothing much else.        Medications and Allergies   Current Outpatient Medications   Medication Instructions    levonorgestrel-ethinyl estradiol (AVIANE,ALESSE,LESSINA) 0.1-20 MG-MCG per tablet 1 tablet, Oral, Daily    sertraline (ZOLOFT) 50 mg, Oral, Daily    vitamin D (ERGOCALCIFEROL) 50,000 Units, Oral, Weekly     No Known Allergies   I have reviewed the above medications and allergies     Objective:      Vitals Vitals:    23 1407   BP: 130/84   BP Location: Right arm   Patient Position: Sitting   Cuff Size: Adult   Pulse: 93   Temp: 98 °F (36.7 °C)   TempSrc: Temporal   SpO2: 99%   Weight: 78 kg (172 lb)   Height: 170 cm (66.93\")     Body mass index is 27 kg/m².    Physical Exam  Vitals reviewed.   Constitutional:       General: She is not in acute distress.     Appearance: She is not ill-appearing.   Pulmonary:      Effort: Pulmonary effort is normal.   Psychiatric:         Mood and Affect: Mood normal.         Behavior: Behavior normal.         Thought Content: Thought content normal.         Judgment: Judgment normal.          Assessment/Plan      Issues Addressed/ Plan  1. Severe episode of recurrent major depressive disorder, without psychotic features  -Minor unwanted side effects with headache initially with medication.  Going to increase medication to 50 mg at this time.  Patient should see better results on higher dose.  Need to repeat " PHQ-9 at next visit.  - sertraline (ZOLOFT) 50 MG tablet; Take 1 tablet by mouth Daily.  Dispense: 30 tablet; Refill: 1     There are no Patient Instructions on file for this visit.      Follow up  recommended Return in about 1 month (around 6/30/2023) for Depression.   - Dragon voice recognition software was utilized to complete this chart.  Every reasonable attempt was made to edit and correct the text, however some incorrect words may remain.   Pavel Fagan, DO

## 2023-06-04 DIAGNOSIS — F33.2 SEVERE EPISODE OF RECURRENT MAJOR DEPRESSIVE DISORDER, WITHOUT PSYCHOTIC FEATURES: ICD-10-CM

## 2023-06-05 RX ORDER — SERTRALINE HYDROCHLORIDE 25 MG/1
25 TABLET, FILM COATED ORAL DAILY
Qty: 30 TABLET | Refills: 0 | OUTPATIENT
Start: 2023-06-05

## 2023-07-28 DIAGNOSIS — F33.2 SEVERE EPISODE OF RECURRENT MAJOR DEPRESSIVE DISORDER, WITHOUT PSYCHOTIC FEATURES: ICD-10-CM

## 2023-08-08 ENCOUNTER — OFFICE VISIT (OUTPATIENT)
Dept: OBSTETRICS AND GYNECOLOGY | Age: 33
End: 2023-08-08
Payer: COMMERCIAL

## 2023-08-08 VITALS
WEIGHT: 176.6 LBS | SYSTOLIC BLOOD PRESSURE: 142 MMHG | DIASTOLIC BLOOD PRESSURE: 82 MMHG | HEIGHT: 67 IN | BODY MASS INDEX: 27.72 KG/M2

## 2023-08-08 DIAGNOSIS — Z01.419 WELL WOMAN EXAM WITH ROUTINE GYNECOLOGICAL EXAM: Primary | ICD-10-CM

## 2023-08-08 DIAGNOSIS — Z30.41 ENCOUNTER FOR SURVEILLANCE OF CONTRACEPTIVE PILLS: ICD-10-CM

## 2023-08-08 RX ORDER — LEVONORGESTREL AND ETHINYL ESTRADIOL 0.1-0.02MG
1 KIT ORAL DAILY
Qty: 84 TABLET | Refills: 3 | Status: SHIPPED | OUTPATIENT
Start: 2023-08-08 | End: 2024-08-07

## 2023-08-08 NOTE — PROGRESS NOTES
Routine Annual Visit    2023    Patient: Angelica Pinon          MR#:[id number]      Chief Complaint   Patient presents with    Gynecologic Exam     Annual exam, last pap 22 (-), no complaints       History of Present Illness    33 y.o. female  who presents for annual exam.  She is doing well, no complaints.  She takes OCPs for contraception and happy with them, declines to start anything else.    Normal Pap in   Does not need mammograms yet    Patient's last menstrual period was 2023 (within days).  Obstetric History:  OB History          1    Para   1    Term   1            AB        Living   1         SAB        IAB        Ectopic        Molar        Multiple        Live Births   1               Menstrual History:     Patient's last menstrual period was 2023 (within days).       ________________________________________  Patient Active Problem List   Diagnosis    Asthma    Congenital duplication of uterus    Vitamin D deficiency       Past Medical History:   Diagnosis Date    Asthma     Didelphic uterus     Headache     Intrauterine growth restriction (IUGR) affecting care of mother        Family History   Problem Relation Age of Onset    No Known Problems Mother     Diabetes Father     Ulcerative colitis Sister     Seizures Sister     No Known Problems Brother     No Known Problems Maternal Grandmother     Cancer Maternal Grandfather     Breast cancer Neg Hx     Ovarian cancer Neg Hx     Uterine cancer Neg Hx     Colon cancer Neg Hx        Past Surgical History:   Procedure Laterality Date     SECTION      WISDOM TOOTH EXTRACTION         Social History     Tobacco Use   Smoking Status Never   Smokeless Tobacco Never       has a current medication list which includes the following prescription(s): levonorgestrel-ethinyl estradiol, sertraline, and vitamin d.  ________________________________________      Review of Systems   Constitutional:  Negative for fever and  "unexpected weight change.   Respiratory:  Negative for shortness of breath.    Cardiovascular:  Negative for chest pain.   Gastrointestinal:  Negative for abdominal pain, constipation and diarrhea.   Genitourinary:  Negative for frequency and urgency.   Hematological:  Negative for adenopathy.   Psychiatric/Behavioral:  Negative for dysphoric mood.      Objective   Physical Exam    /82   Ht 170.2 cm (67\")   Wt 80.1 kg (176 lb 9.6 oz)   LMP 08/03/2023 (Within Days)   BMI 27.66 kg/mý    BP Readings from Last 3 Encounters:   08/08/23 142/82   06/27/23 130/80   05/30/23 130/84      Wt Readings from Last 3 Encounters:   08/08/23 80.1 kg (176 lb 9.6 oz)   06/27/23 77.5 kg (170 lb 12.8 oz)   05/30/23 78 kg (172 lb)         BMI: Body mass index is 27.66 kg/mý.       General:   alert, appears stated age, and cooperative   Neck: No thyromegaly or LAD   Heart:: regular rate and rhythm, S1, S2 normal, no murmur, click, rub or gallop   Lungs: normal respiratory effort and auscultation   Abdomen: soft, non-tender, without masses or organomegaly   Breast: inspection negative, no nipple discharge or bleeding, no masses or nodularity palpable   Urethra and bladder: urethral meatus normal; bladder nontender to palpation;   Vulva: normal, Bartholin's, Urethra, [place]'s normal   Vagina: normal mucosa, normal discharge   Cervix: multiparous appearance and no lesions   Uterus: normal size, non-tender, and anteverted   Adnexa: normal adnexa and no mass, fullness, tenderness       Assessment:    normal annual exam   Ocp contraception    Plan:    Plan     []  Mammogram request made  []  PAP done UTD  []  Labs:   []  GC/Chl/TV  []  DEXA scan   []  Referral for colonoscopy:     Refill of ocps sent    Counseling  [x]  Nutrition  [x]  Physical activity/regular exercise   [x]  Healthy weight  []  Injury prevention  []  Smoking cessation  []  Substance misuse/abuse  [x]  Sexual behavior  []  STD prevention  [x]  Contraception  []  Dental " health  []  Mental health  []  Immunization  [x]  Encouraged SBE        Radha Tristan MD  08/08/2023  16:30 EDT

## 2023-08-10 DIAGNOSIS — E55.9 VITAMIN D DEFICIENCY: ICD-10-CM

## 2023-08-10 RX ORDER — ERGOCALCIFEROL 1.25 MG/1
CAPSULE ORAL
Qty: 5 CAPSULE | Refills: 0 | Status: SHIPPED | OUTPATIENT
Start: 2023-08-10

## 2023-09-18 DIAGNOSIS — E55.9 VITAMIN D DEFICIENCY: ICD-10-CM

## 2023-09-18 RX ORDER — ERGOCALCIFEROL 1.25 MG/1
CAPSULE ORAL
Qty: 5 CAPSULE | Refills: 2 | Status: SHIPPED | OUTPATIENT
Start: 2023-09-18

## 2023-09-27 DIAGNOSIS — F33.2 SEVERE EPISODE OF RECURRENT MAJOR DEPRESSIVE DISORDER, WITHOUT PSYCHOTIC FEATURES: ICD-10-CM

## 2023-09-28 NOTE — TELEPHONE ENCOUNTER
Rx Refill Note  Requested Prescriptions     Pending Prescriptions Disp Refills    sertraline (ZOLOFT) 50 MG tablet [Pharmacy Med Name: SERTRALINE 50MG TABLETS] 30 tablet 1     Sig: TAKE 1 TABLET BY MOUTH DAILY      Last office visit with prescribing clinician: 6/27/2023   Last telemedicine visit with prescribing clinician: Visit date not found   Next office visit with prescribing clinician: 10/12/2023

## 2023-10-12 ENCOUNTER — OFFICE VISIT (OUTPATIENT)
Dept: FAMILY MEDICINE CLINIC | Facility: CLINIC | Age: 33
End: 2023-10-12
Payer: COMMERCIAL

## 2023-10-12 VITALS
RESPIRATION RATE: 18 BRPM | SYSTOLIC BLOOD PRESSURE: 132 MMHG | DIASTOLIC BLOOD PRESSURE: 88 MMHG | WEIGHT: 184.2 LBS | HEIGHT: 67 IN | HEART RATE: 84 BPM | OXYGEN SATURATION: 99 % | BODY MASS INDEX: 28.91 KG/M2

## 2023-10-12 DIAGNOSIS — E66.3 OVERWEIGHT (BMI 25.0-29.9): ICD-10-CM

## 2023-10-12 DIAGNOSIS — Z23 NEED FOR INFLUENZA VACCINATION: ICD-10-CM

## 2023-10-12 DIAGNOSIS — Z00.00 ENCOUNTER FOR WELL ADULT EXAM WITHOUT ABNORMAL FINDINGS: Primary | ICD-10-CM

## 2023-10-12 NOTE — PROGRESS NOTES
Pavel Fagan DO  [facility] PRIMARY CARE  [facility]  [place] [id number] [phone]    Subjective      Name Angelica Pinon MRN [id number]    1990 AGE/SEX 33 y.o. / female      Chief Complaint Chief Complaint   Patient presents with    Annual Exam     Here for annual exam         Visit History for  10/12/2023    History of Present Illness  Angelica Pinon 33 y.o. female who presents for an Annual Wellness Visit. She has a history of   Patient Active Problem List   Diagnosis    Asthma    Congenital duplication of uterus    Vitamin D deficiency   .        Current Life Goals: To be a great mom to her son.       Patient Care Team:  Pavel Fagan DO as PCP - General (Family Medicine)  Nell Villareal MD as Consulting Physician (Obstetrics and Gynecology)      Health Habits     Diet & Exercise:       Diet:     [x] Generally Healthy   [] Low Carb    [] Vegetarian            Exercise:     Type: hiking and Yoga  Frequency: 1 times/week.       Tobacco Use:     Social History     Tobacco Use   Smoking Status Never   Smokeless Tobacco Never        Angelica Pinon  reports that she has never smoked. She has never used smokeless tobacco.            Alcohol Use:     Social History     Substance and Sexual Activity   Alcohol Use No         Counseling Given: [] Yes  [x] No       Dental Exam:   [] Up to date  [] Scheduled  [x] Needed   Last Exam: More than a year.       Eye Exam:   [] Up to date  [] Scheduled  [] Needed   Last Exam: Not needed     Screenings:     PHQ-9 Depression Screening  Little interest or pleasure in doing things? 0-->not at all   Feeling down, depressed, or hopeless? 0-->not at all   Trouble falling or staying asleep, or sleeping too much?     Feeling tired or having little energy?     Poor appetite or overeating?     Feeling bad about yourself - or that you are a failure or have let yourself or your family down?     Trouble concentrating on things, such as reading  "the newspaper or watching television?     Moving or speaking so slowly that other people could have noticed? Or the opposite - being so fidgety or restless that you have been moving around a lot more than usual?     Thoughts that you would be better off dead, or of hurting yourself in some way?     PHQ-9 Total Score 0   If you checked off any problems, how difficult have these problems made it for you to do your work, take care of things at home, or get along with other people?        Hepatitis C Screening:   No results found for: \"HEPCVIRUSABY\"    Lung Cancer Screening: Qualifies? [] Yes  [] No   Completed:    Colon Cancer Screening:   Last Completed Colonoscopy       This patient has no relevant Health Maintenance data.             Breast Cancer Screening:   Last Completed Mammogram       This patient has no relevant Health Maintenance data.             Cervical Cancer Screening:   Last Completed Pap Smear            PAP SMEAR (Every 3 Years) Next due on 2022  IGP, Apt HPV,rfx 16 / 18,45    2018  Done                       Advance Care Planning  Patient has an advance directive in EMR which has been updated.    Review of Systems    The following portions of the patient's history were reviewed and updated as appropriate: allergies, current medications, past family history, past medical history, past social history, past surgical history and problem list.     Past Medical, Family, Social History     Medical History: has a past medical history of Asthma, Didelphic uterus, Headache, and Intrauterine growth restriction (IUGR) affecting care of mother.   Surgical History: has a past surgical history that includes  section and [place] tooth extraction.   Family History: family history includes Cancer in her maternal grandfather; Diabetes in her father; No Known Problems in her brother, maternal grandmother, and mother; Seizures in her sister; Ulcerative colitis in her sister.   Social " "History: reports that she has never smoked. She has never used smokeless tobacco. She reports that she does not drink alcohol and does not use drugs.       Medications and Allergies   Current Outpatient Medications   Medication Instructions    levonorgestrel-ethinyl estradiol (AVIANE,ALESSE,LESSINA) 0.1-20 MG-MCG per tablet 1 tablet, Oral, Daily    sertraline (ZOLOFT) 50 mg, Oral, Daily    vitamin D (ERGOCALCIFEROL) 1.25 MG (53508 UT) capsule capsule TAKE 1 CAPSULE BY MOUTH 1 TIME EVERY WEEK     No Known Allergies       Objective:      Vitals Vitals:    10/12/23 1035   BP: 132/88   BP Location: Right arm   Patient Position: Sitting   Cuff Size: Adult   Pulse: 84   Resp: 18   SpO2: 99%   Weight: 83.6 kg (184 lb 3.2 oz)   Height: 170.2 cm (67\")     Body mass index is 28.85 kg/m².    Physical Exam  Vitals reviewed.   Constitutional:       General: She is not in acute distress.     Appearance: She is not ill-appearing.   Cardiovascular:      Rate and Rhythm: Normal rate and regular rhythm.   Pulmonary:      Effort: Pulmonary effort is normal.      Breath sounds: Normal breath sounds.   Neurological:      Mental Status: She is alert.   Psychiatric:         Mood and Affect: Mood normal.         Behavior: Behavior normal.         Thought Content: Thought content normal.         Judgment: Judgment normal.            Assessment/Plan      Issues Addressed/ Plan   Diagnosis Plan   1. Encounter for well adult exam without abnormal findings        2. Need for influenza vaccination  Flublok 18+yrs      3. Overweight (BMI 25.0-29.9)          No abnormal findings on exam today.  Overall in good health and meeting health goals.      Discussion:    Wears seat belt? [x] Yes  [] No     Wears sunscreen? [x] Yes  [] No      BMI: Body mass index is 28.85 kg/m².    BMI is >= 25 and <30. (Overweight) The following options were offered after discussion;: exercise counseling/recommendations and nutrition counseling/recommendations    "     There are no Patient Instructions on file for this visit.      Follow up  recommended Return in about 1 year (around 10/12/2024) for Annual physical.     Pavel Fagan, DO

## 2023-10-28 DIAGNOSIS — F33.2 SEVERE EPISODE OF RECURRENT MAJOR DEPRESSIVE DISORDER, WITHOUT PSYCHOTIC FEATURES: ICD-10-CM

## 2023-10-28 PROBLEM — E66.3 OVERWEIGHT (BMI 25.0-29.9): Status: ACTIVE | Noted: 2023-10-28

## 2023-10-28 NOTE — ASSESSMENT & PLAN NOTE
Patient's (Body mass index is 28.85 kg/m².) indicates that they are overweight with health conditions that include none . Weight is unchanged. BMI is is above average; BMI management plan is completed. We discussed portion control and increasing exercise.

## 2023-11-24 DIAGNOSIS — F33.2 SEVERE EPISODE OF RECURRENT MAJOR DEPRESSIVE DISORDER, WITHOUT PSYCHOTIC FEATURES: ICD-10-CM

## 2023-12-29 DIAGNOSIS — F33.2 SEVERE EPISODE OF RECURRENT MAJOR DEPRESSIVE DISORDER, WITHOUT PSYCHOTIC FEATURES: ICD-10-CM

## 2024-02-26 ENCOUNTER — PATIENT ROUNDING (BHMG ONLY) (OUTPATIENT)
Dept: FAMILY MEDICINE CLINIC | Facility: CLINIC | Age: 34
End: 2024-02-26
Payer: COMMERCIAL

## 2024-02-26 ENCOUNTER — OFFICE VISIT (OUTPATIENT)
Dept: FAMILY MEDICINE CLINIC | Facility: CLINIC | Age: 34
End: 2024-02-26
Payer: COMMERCIAL

## 2024-02-26 VITALS
HEIGHT: 67 IN | WEIGHT: 191 LBS | DIASTOLIC BLOOD PRESSURE: 78 MMHG | OXYGEN SATURATION: 97 % | RESPIRATION RATE: 18 BRPM | HEART RATE: 91 BPM | BODY MASS INDEX: 29.98 KG/M2 | SYSTOLIC BLOOD PRESSURE: 114 MMHG

## 2024-02-26 DIAGNOSIS — R10.9 RIGHT SIDED ABDOMINAL PAIN: Primary | ICD-10-CM

## 2024-02-26 PROCEDURE — 99213 OFFICE O/P EST LOW 20 MIN: CPT | Performed by: STUDENT IN AN ORGANIZED HEALTH CARE EDUCATION/TRAINING PROGRAM

## 2024-02-26 NOTE — PROGRESS NOTES
"    Pavel Fagan DO  [facility] PRIMARY CARE  [address] [place] [address] [address]  [place] [address] [phone]    Subjective      Name Angelica Pinon MRN [id number]    1990 AGE/SEX 33 y.o. / female      Chief Complaint Chief Complaint   Patient presents with    Abdominal Pain     Lower abdominal pain, notices more after she eats         Visit History for  2024    History of Present Illness  Angelica Pinon is a 33 y.o. female who presented today for Abdominal Pain (Lower abdominal pain, notices more after she eats)  .    Experiencing intermittent right-sided lower abdominal pain. Onset of symptoms was a couple of months ago, symptoms resolved, symptoms now returning. Occurs occasionally after eating. Dull pain presents for a moment or two, then resolves before returning again. Occurring on a daily basis, but not consistent. Described the pain more as annoying. Present consistently for the past week. Not associated with menstrual cycle. Denies it being associated with certain foods. More issues with constipation and some diarrhea. Denies taking anything for constipation. Not drinking as much water as normal lately. Denies eating food high in fiber. Denies issues with ovarian cysts in the past.         Medications and Allergies   No current outpatient medications    No Known Allergies   I have reviewed the above medications and allergies     Objective:      Vitals Vitals:    24 1159   BP: 114/78   BP Location: Left arm   Patient Position: Sitting   Cuff Size: Large Adult   Pulse: 91   Resp: 18   SpO2: 97%   Weight: 86.6 kg (191 lb)   Height: 170.2 cm (67\")     Body mass index is 29.91 kg/m².    Physical Exam  Vitals reviewed.   Constitutional:       General: She is not in acute distress.     Appearance: She is not ill-appearing.   Cardiovascular:      Rate and Rhythm: Normal rate and regular rhythm.   Pulmonary:      Effort: Pulmonary effort is normal.      Breath sounds: Normal breath " sounds.   Abdominal:      General: There is no distension.      Tenderness: There is no abdominal tenderness. There is no guarding.   Neurological:      Mental Status: She is alert.   Psychiatric:         Mood and Affect: Mood normal.         Behavior: Behavior normal.         Thought Content: Thought content normal.         Judgment: Judgment normal.            Assessment/Plan      Issues Addressed/ Plan   Diagnosis Plan   1. Right sided abdominal pain             1. Right-sided abdominal pain.  - Recommended she start using a daily fiber supplement like FiberCon or Metamucil. She may also use a small amount of MiraLAX once a day. If her symptoms persist or worsen, she will let me know. I advised if the pain worsens to the point of not being able to move, she should present to the hospital.    There are no Patient Instructions on file for this visit.            Follow up  recommended Return if symptoms worsen or fail to improve.   - Dragon voice recognition software was utilized to complete this chart.  Every reasonable attempt was made to edit and correct the text, however some incorrect words may remain.   Pavel Fagan, DO

## 2024-02-26 NOTE — PROGRESS NOTES
A [employer] message has been sent to the patient for PATIENT ROUNDING with [facility]

## 2024-03-26 ENCOUNTER — OFFICE VISIT (OUTPATIENT)
Dept: OBSTETRICS AND GYNECOLOGY | Age: 34
End: 2024-03-26
Payer: COMMERCIAL

## 2024-03-26 VITALS
HEIGHT: 67 IN | BODY MASS INDEX: 30.73 KG/M2 | WEIGHT: 195.8 LBS | DIASTOLIC BLOOD PRESSURE: 74 MMHG | SYSTOLIC BLOOD PRESSURE: 118 MMHG

## 2024-03-26 DIAGNOSIS — Z34.90 EARLY STAGE OF PREGNANCY: Primary | ICD-10-CM

## 2024-03-26 DIAGNOSIS — Q51.28 DIDELPHIC UTERUS: ICD-10-CM

## 2024-03-26 RX ORDER — PNV NO.95/FERROUS FUM/FOLIC AC 28MG-0.8MG
1 TABLET ORAL DAILY
Qty: 30 TABLET | Refills: 11 | Status: SHIPPED | OUTPATIENT
Start: 2024-03-26

## 2024-03-26 NOTE — PROGRESS NOTES
"Subjective     History of Present Illness  Angelica Pinon is a 34 y.o.  female is being seen today for   Chief Complaint   Patient presents with    Gynecologic Exam     Pregnancy Confirmation LMP 2/10/24 with US     Patient here today for pregnancy confirmation. LMP 2/10/2024. TVUS shows didelphic uterus with pregnancy and right uterus, heart motion detected 114 bpm, yolk sac and fetal pole present.  Gestational age cannot be measured.   C/o heartburn at night. Some nausea, no vomiting.   Not taking prenatals.  No other medications.   PAP is utd, normal/negative in .  Partner - Andrei, is with her for today's appointment    Imaging reviewed:  US Ob Transvaginal (2024 12:56)     The following portions of the patient's history were reviewed and updated as appropriate: allergies, current medications, past family history, past medical history, past social history, past surgical history and problem list.    /74   Ht 170.2 cm (67\")   Wt 88.8 kg (195 lb 12.8 oz)   LMP 02/10/2024 (Approximate)   BMI 30.67 kg/m²         Review of Systems   Constitutional: Negative.    HENT: Negative.     Eyes: Negative.    Respiratory: Negative.     Cardiovascular: Negative.    Gastrointestinal:  Positive for nausea (+heartburn).   Endocrine: Negative.    Genitourinary: Negative.    Musculoskeletal: Negative.    Skin: Negative.    Allergic/Immunologic: Negative.    Neurological: Negative.    Hematological: Negative.    Psychiatric/Behavioral: Negative.         Objective   Physical Exam  Constitutional:       General: She is not in acute distress.  Cardiovascular:      Rate and Rhythm: Normal rate and regular rhythm.      Pulses: Normal pulses.      Heart sounds: Normal heart sounds.   Pulmonary:      Effort: Pulmonary effort is normal.      Breath sounds: Normal breath sounds.   Neurological:      General: No focal deficit present.      Mental Status: She is alert and oriented to person, place, and time. "   Psychiatric:         Mood and Affect: Mood normal.         Behavior: Behavior normal.         Thought Content: Thought content normal.         Judgment: Judgment normal.           Assessment & Plan   Diagnoses and all orders for this visit:    1. Early stage of pregnancy (Primary)  -     Prenatal Vit-Fe Fumarate-FA (prenatal vitamin 28-0.8) 28-0.8 MG tablet tablet; Take 1 tablet by mouth Daily.  Dispense: 30 tablet; Refill: 11    2. Didelphic uterus    Prenatal vitamins sent to pharmacy, advised patient to take these daily  Reviewed TVUS findings with the patient.  Early pregnancy with low heart rate, plan to repeat in 1 week.  SAB warnings reviewed  Information sheet on approved medications during pregnancy given to patient  Early pregnancy counseling provided   Problem list reviewed and updated  Reviewed routine prenatal care with the office to include but not limited to expected weight gain during pregnancy, Tylenol products are fine, avoid aspirin and ibuprofen; not to change cat litter; food restrictions; avoidance of alcohol, tobacco, drugs and saunas/hot tubs. Discussed that the COVID and Flu vaccine is safe and recommended in pregnancy.     All questions answered. Patient verbalizes understanding and is agreeable to plan.  Return in about 1 week (around 4/2/2024) for TVUS and appt.

## 2024-04-05 ENCOUNTER — ROUTINE PRENATAL (OUTPATIENT)
Dept: OBSTETRICS AND GYNECOLOGY | Age: 34
End: 2024-04-05
Payer: COMMERCIAL

## 2024-04-05 VITALS — WEIGHT: 193.6 LBS | DIASTOLIC BLOOD PRESSURE: 60 MMHG | BODY MASS INDEX: 30.32 KG/M2 | SYSTOLIC BLOOD PRESSURE: 120 MMHG

## 2024-04-05 DIAGNOSIS — Q51.28 CONGENITAL DUPLICATION OF UTERUS: ICD-10-CM

## 2024-04-05 DIAGNOSIS — Z13.89 SCREENING FOR BLOOD OR PROTEIN IN URINE: Primary | ICD-10-CM

## 2024-04-05 DIAGNOSIS — Z3A.01 6 WEEKS GESTATION OF PREGNANCY: ICD-10-CM

## 2024-04-05 DIAGNOSIS — O21.9 NAUSEA AND VOMITING DURING PREGNANCY: ICD-10-CM

## 2024-04-05 DIAGNOSIS — Z11.3 ROUTINE SCREENING FOR STI (SEXUALLY TRANSMITTED INFECTION): ICD-10-CM

## 2024-04-05 DIAGNOSIS — J45.20 MILD INTERMITTENT ASTHMA WITHOUT COMPLICATION: ICD-10-CM

## 2024-04-05 DIAGNOSIS — Z13.0 SCREENING FOR IRON DEFICIENCY ANEMIA: ICD-10-CM

## 2024-04-05 LAB
BILIRUB BLD-MCNC: NEGATIVE MG/DL
GLUCOSE UR STRIP-MCNC: NEGATIVE MG/DL
KETONES UR QL: ABNORMAL
LEUKOCYTE EST, POC: ABNORMAL
NITRITE UR-MCNC: NEGATIVE MG/ML
PH UR: 7 [PH] (ref 5–8)
PROT UR STRIP-MCNC: NEGATIVE MG/DL
RBC # UR STRIP: ABNORMAL /UL
SP GR UR: 1.01 (ref 1–1.03)
UROBILINOGEN UR QL: ABNORMAL

## 2024-04-05 RX ORDER — PROMETHAZINE HYDROCHLORIDE 12.5 MG/1
12.5 TABLET ORAL EVERY 6 HOURS PRN
Qty: 30 TABLET | Refills: 0 | Status: SHIPPED | OUTPATIENT
Start: 2024-04-05

## 2024-04-05 RX ORDER — DIPHENHYDRAMINE HYDROCHLORIDE 25 MG/1
25 CAPSULE ORAL 3 TIMES DAILY
Qty: 90 TABLET | Refills: 3 | Status: SHIPPED | OUTPATIENT
Start: 2024-04-05

## 2024-04-05 NOTE — PROGRESS NOTES
"[facility]   Obstetrics and Gynecology   New Obstetric Visit    2024    Patient: Angelica Pinon          MR#:[id number]    History of Present Illness    Chief Complaint   Patient presents with    Routine Prenatal Visit     C/o : patient coming in for routine prenatal visit   Patient stated \" she is experiencing nausea \" .     Pregnancy Ultrasound       34 y.o. female  at Unknown presents for NOB visit.  She is doing well today.  Taking prenatal vitamins. Having some nausea and vomiting, but able to keep down food and fluids.     Studies reviewed: TVUS 24    ________________________________________  Patient Active Problem List   Diagnosis    Asthma    Congenital duplication of uterus    Vitamin D deficiency    Overweight (BMI 25.0-29.9)    6 weeks gestation of pregnancy     OB History          2    Para   1    Term   1            AB        Living   1         SAB        IAB        Ectopic        Molar        Multiple        Live Births   1                  Social History:  Denies h/o gonorrhea, chlamydia, herpes, syphilis, HIV  Denies family history of birth defects and genetic disorders    Past Medical History:   Diagnosis Date    Asthma     Didelphic uterus     Headache     Intrauterine growth restriction (IUGR) affecting care of mother      Past Surgical History:   Procedure Laterality Date     SECTION      WISDOM TOOTH EXTRACTION       Social History     Tobacco Use   Smoking Status Never   Smokeless Tobacco Never     Family History   Problem Relation Age of Onset    No Known Problems Mother     Diabetes Father     Ulcerative colitis Sister     Seizures Sister     No Known Problems Brother     No Known Problems Maternal Grandmother     Cancer Maternal Grandfather     Breast cancer Neg Hx     Ovarian cancer Neg Hx     Uterine cancer Neg Hx     Colon cancer Neg Hx      Prior to Admission medications    Medication Sig Start Date End Date Taking? Authorizing Provider " "  Prenatal Vit-Fe Fumarate-FA (prenatal vitamin 28-0.8) 28-0.8 MG tablet tablet Take 1 tablet by mouth Daily. 3/26/24  Yes Jaci Akins PA-C     ________________________________________    The following portions of the patient's history were reviewed and updated as appropriate: allergies, current medications, past family history, past medical history, past social history, past surgical history, and problem list.    Review of Systems   All other systems reviewed and are negative.           Objective     /60   Wt 87.8 kg (193 lb 9.6 oz)   LMP 02/10/2024 (Approximate)   BMI 30.32 kg/m²    BP Readings from Last 3 Encounters:   24 120/60   24 118/74   24 114/78      Wt Readings from Last 3 Encounters:   24 87.8 kg (193 lb 9.6 oz)   24 88.8 kg (195 lb 12.8 oz)   24 86.6 kg (191 lb)        BMI: Estimated body mass index is 30.32 kg/m² as calculated from the following:    Height as of 3/26/24: 170.2 cm (67\").    Weight as of this encounter: 87.8 kg (193 lb 9.6 oz).    Physical Exam  Vitals and nursing note reviewed.   Constitutional:       Appearance: Normal appearance.   HENT:      Head: Normocephalic and atraumatic.   Pulmonary:      Effort: Pulmonary effort is normal.   Neurological:      Mental Status: She is oriented to person, place, and time.   Psychiatric:         Mood and Affect: Mood normal.           Assessment:  34 y.o. female  at Unknown presents for NOB visit.  Diagnoses and all orders for this visit:    1. Screening for blood or protein in urine (Primary)  -     POC Urinalysis Dipstick    2. Screening for iron deficiency anemia  -     CBC (No Diff)  -     Hemoglobinopathy Fractionation Cascade    3. Routine screening for STI (sexually transmitted infection)  -     Hepatitis B Surface Antigen  -     HIV-1 / O / 2 Ag / Antibody  -     RPR, Rfx Qn RPR / Confirm TP  -     HCV Antibody Rfx To Qnt PCR  -     Chlamydia trachomatis, Neisseria gonorrhoeae, " Trichomonas vaginalis, PCR - Urine, Urine, Clean Catch    4. 6 weeks gestation of pregnancy  Overview:  -PNL: IOB labs 4/5/24,plan for GBS at 36wga  -Pap: Normal last year  -Genetics: cfDNA/msAFP/carrier @ 10 weeks, anatomy Us @20  -Imm: titers pending, tdap @ 28 weeks  -Contraception: _  -Birthplan: _  -Care coordination: accepts blood transfusions, no latex allergy, call schedule reviewed         Orders:  -     ABO / Rh  -     Rubella Antibody, IgG  -     Antibody Screen  -     Urine Culture - Urine, Urine, Clean Catch  -     Varicella Zoster Antibody, IgG    5. Congenital duplication of uterus    6. Nausea and vomiting during pregnancy  -     doxylamine (UNISOM) 25 MG tablet; Take 1 tablet by mouth At Night As Needed for Nausea.  Dispense: 30 tablet; Refill: 0  -     vitamin B-6 (PYRIDOXINE) 25 MG tablet; Take 1 tablet by mouth 3 times a day.  Dispense: 90 tablet; Refill: 3  -     promethazine (PHENERGAN) 12.5 MG tablet; Take 1 tablet by mouth Every 6 (Six) Hours As Needed for Nausea or Vomiting.  Dispense: 30 tablet; Refill: 0    7. Mild intermittent asthma without complication          Plan:  Return in about 4 weeks (around 5/3/2024).  Full exam with next visit     Diana Rivera MD  4/5/2024 09:27 EDT

## 2024-04-07 LAB
BACTERIA UR CULT: NORMAL
BACTERIA UR CULT: NORMAL

## 2024-04-08 LAB
ABO GROUP BLD: NORMAL
BLD GP AB SCN SERPL QL: NEGATIVE
ERYTHROCYTE [DISTWIDTH] IN BLOOD BY AUTOMATED COUNT: 13.2 % (ref 11.7–15.4)
HBV SURFACE AG SERPL QL IA: NEGATIVE
HCT VFR BLD AUTO: 43.1 % (ref 34–46.6)
HCV AB SERPL QL IA: NORMAL
HCV IGG SERPL QL IA: NON REACTIVE
HGB A MFR BLD ELPH: 97.5 % (ref 96.4–98.8)
HGB A2 MFR BLD ELPH: 2.5 % (ref 1.8–3.2)
HGB BLD-MCNC: 14.1 G/DL (ref 11.1–15.9)
HGB F MFR BLD ELPH: 0 % (ref 0–2)
HGB FRACT BLD-IMP: NORMAL
HGB S MFR BLD ELPH: 0 %
HIV 1+2 AB+HIV1 P24 AG SERPL QL IA: NON REACTIVE
MCH RBC QN AUTO: 28 PG (ref 26.6–33)
MCHC RBC AUTO-ENTMCNC: 32.7 G/DL (ref 31.5–35.7)
MCV RBC AUTO: 86 FL (ref 79–97)
PLATELET # BLD AUTO: 231 X10E3/UL (ref 150–450)
RBC # BLD AUTO: 5.04 X10E6/UL (ref 3.77–5.28)
RH BLD: POSITIVE
RPR SER QL: NON REACTIVE
RUBV IGG SERPL IA-ACNC: 1.86 INDEX
VZV IGG SER IA-ACNC: 1111 INDEX
WBC # BLD AUTO: 6.7 X10E3/UL (ref 3.4–10.8)

## 2024-04-09 LAB
C TRACH RRNA SPEC QL NAA+PROBE: NEGATIVE
N GONORRHOEA RRNA SPEC QL NAA+PROBE: NEGATIVE
T VAGINALIS RRNA SPEC QL NAA+PROBE: NEGATIVE

## 2024-05-03 ENCOUNTER — DOCUMENTATION (OUTPATIENT)
Dept: OBSTETRICS AND GYNECOLOGY | Age: 34
End: 2024-05-03
Payer: COMMERCIAL

## 2024-05-03 ENCOUNTER — ROUTINE PRENATAL (OUTPATIENT)
Dept: OBSTETRICS AND GYNECOLOGY | Age: 34
End: 2024-05-03
Payer: COMMERCIAL

## 2024-05-03 VITALS — DIASTOLIC BLOOD PRESSURE: 72 MMHG | SYSTOLIC BLOOD PRESSURE: 120 MMHG | WEIGHT: 188 LBS | BODY MASS INDEX: 29.44 KG/M2

## 2024-05-03 DIAGNOSIS — Z31.5 ENCOUNTER FOR PROCREATIVE GENETIC COUNSELING: ICD-10-CM

## 2024-05-03 DIAGNOSIS — Z13.89 SCREENING FOR BLOOD OR PROTEIN IN URINE: Primary | ICD-10-CM

## 2024-05-03 DIAGNOSIS — J45.20 MILD INTERMITTENT ASTHMA WITHOUT COMPLICATION: ICD-10-CM

## 2024-05-03 DIAGNOSIS — O34.219 HISTORY OF CESAREAN DELIVERY AFFECTING PREGNANCY: ICD-10-CM

## 2024-05-03 DIAGNOSIS — O21.9 NAUSEA AND VOMITING DURING PREGNANCY: ICD-10-CM

## 2024-05-03 DIAGNOSIS — E55.9 VITAMIN D DEFICIENCY: ICD-10-CM

## 2024-05-03 DIAGNOSIS — Z3A.10 10 WEEKS GESTATION OF PREGNANCY: ICD-10-CM

## 2024-05-03 DIAGNOSIS — Q51.28 CONGENITAL DUPLICATION OF UTERUS: ICD-10-CM

## 2024-05-03 DIAGNOSIS — Z34.81 ENCOUNTER FOR SUPERVISION OF OTHER NORMAL PREGNANCY IN FIRST TRIMESTER: ICD-10-CM

## 2024-05-03 PROBLEM — E66.3 OVERWEIGHT (BMI 25.0-29.9): Status: RESOLVED | Noted: 2023-10-28 | Resolved: 2024-05-03

## 2024-05-03 LAB
BILIRUB BLD-MCNC: NEGATIVE MG/DL
GLUCOSE UR STRIP-MCNC: NEGATIVE MG/DL
KETONES UR QL: NEGATIVE
LEUKOCYTE EST, POC: NEGATIVE
NITRITE UR-MCNC: NEGATIVE MG/ML
PH UR: 6 [PH] (ref 5–8)
PROT UR STRIP-MCNC: NEGATIVE MG/DL
RBC # UR STRIP: NEGATIVE /UL
SP GR UR: 1.02 (ref 1–1.03)
UROBILINOGEN UR QL: NORMAL

## 2024-05-03 RX ORDER — ONDANSETRON 4 MG/1
4 TABLET, FILM COATED ORAL EVERY 4 HOURS PRN
Qty: 60 TABLET | Refills: 3 | Status: SHIPPED | OUTPATIENT
Start: 2024-05-03 | End: 2025-05-03

## 2024-05-03 NOTE — PROGRESS NOTES
Angelica Pinon, a 34 y.o.  at 10w6d, presents for OB follow-up.  She is doing well today.  Denies loss of fluid, vaginal bleeding, and contractions.  Endorses fetal movements.    Objective  BP Readings from Last 3 Encounters:   24 120/72   24 120/60   24 118/74      Wt Readings from Last 3 Encounters:   24 85.3 kg (188 lb)   24 87.8 kg (193 lb 9.6 oz)   24 88.8 kg (195 lb 12.8 oz)      Total weight gain this pregnancy: Not found.    General: Awake, alert, no apparent distress  Respiratory: No increased work of breathing  Abdomen: Fundus soft and nontender  Extremity: Nontender, no edema    A/P  Diagnoses and all orders for this visit:    1. Screening for blood or protein in urine (Primary)  -     POC Urinalysis Dipstick    2. 10 weeks gestation of pregnancy  Overview:  -PNL: IOB labs 24,plan for GBS at 36wga  -Pap: Normal last year  -Genetics: cfDNA/msAFP/carrier @ 10 weeks, anatomy Us @20  -Imm: titers pending, tdap @ 28 weeks  -Contraception: _  -Birthplan: _  -Care coordination: accepts blood transfusions, no latex allergy, call schedule reviewed           3. Vitamin D deficiency    4. Congenital duplication of uterus    5. Mild intermittent asthma without complication    6. History of  delivery affecting pregnancy        Labor precautions reviewed  No follow-ups on file.    Diana Rivera MD

## 2024-05-15 ENCOUNTER — TELEPHONE (OUTPATIENT)
Dept: OBSTETRICS AND GYNECOLOGY | Age: 34
End: 2024-05-15
Payer: COMMERCIAL

## 2024-05-15 LAB
Lab: ABNORMAL
NTRA 22Q11.2 DELETION SYNDROME POPULATION-BASED RISK TEXT: ABNORMAL
NTRA 22Q11.2 DELETION SYNDROME RESULT TEXT: ABNORMAL
NTRA 22Q11.2 DELETION SYNDROME RISK SCORE TEXT: ABNORMAL
NTRA FETAL FRACTION: ABNORMAL
NTRA GENDER OF FETUS: ABNORMAL
NTRA MONOSOMY X AGE-BASED RISK TEXT: ABNORMAL
NTRA MONOSOMY X RESULT TEXT: ABNORMAL
NTRA MONOSOMY X RISK SCORE TEXT: ABNORMAL
NTRA TRIPLOIDY 13 18 AGE-BASED RISK TEXT: ABNORMAL
NTRA TRIPLOIDY 13 18 RESULT TEXT: ABNORMAL
NTRA TRIPLOIDY 13 18 RISK SCORE TEXT: ABNORMAL
NTRA TRISOMY 21 AGE-BASED RISK TEXT: ABNORMAL
NTRA TRISOMY 21 RESULT TEXT: ABNORMAL
NTRA TRISOMY 21 RISK SCORE TEXT: ABNORMAL

## 2024-05-15 NOTE — TELEPHONE ENCOUNTER
Ravi called stating that her results showed low fetal fraction and can either be re-drawn or sent to high risk counselor. Ravi will be sending report over shortly

## 2024-05-16 ENCOUNTER — CLINICAL SUPPORT (OUTPATIENT)
Dept: OBSTETRICS AND GYNECOLOGY | Age: 34
End: 2024-05-16
Payer: COMMERCIAL

## 2024-05-16 DIAGNOSIS — Z3A.12 12 WEEKS GESTATION OF PREGNANCY: Primary | ICD-10-CM

## 2024-05-31 ENCOUNTER — ROUTINE PRENATAL (OUTPATIENT)
Dept: OBSTETRICS AND GYNECOLOGY | Age: 34
End: 2024-05-31
Payer: COMMERCIAL

## 2024-05-31 VITALS — SYSTOLIC BLOOD PRESSURE: 104 MMHG | BODY MASS INDEX: 29.13 KG/M2 | WEIGHT: 186 LBS | DIASTOLIC BLOOD PRESSURE: 68 MMHG

## 2024-05-31 DIAGNOSIS — E55.9 VITAMIN D DEFICIENCY: ICD-10-CM

## 2024-05-31 DIAGNOSIS — O34.219 HISTORY OF CESAREAN DELIVERY AFFECTING PREGNANCY: ICD-10-CM

## 2024-05-31 DIAGNOSIS — J45.20 MILD INTERMITTENT ASTHMA WITHOUT COMPLICATION: ICD-10-CM

## 2024-05-31 DIAGNOSIS — Q51.28 CONGENITAL DUPLICATION OF UTERUS: ICD-10-CM

## 2024-05-31 DIAGNOSIS — Z3A.14 14 WEEKS GESTATION OF PREGNANCY: Primary | ICD-10-CM

## 2024-05-31 LAB
BILIRUB BLD-MCNC: NEGATIVE MG/DL
CLARITY, POC: CLEAR
COLOR UR: YELLOW
GLUCOSE UR STRIP-MCNC: NEGATIVE MG/DL
KETONES UR QL: ABNORMAL
LEUKOCYTE EST, POC: ABNORMAL
NITRITE UR-MCNC: NEGATIVE MG/ML
PH UR: 7 [PH] (ref 5–8)
PROT UR STRIP-MCNC: ABNORMAL MG/DL
RBC # UR STRIP: NEGATIVE /UL
SP GR UR: 1.02 (ref 1–1.03)
UROBILINOGEN UR QL: ABNORMAL

## 2024-05-31 NOTE — PROGRESS NOTES
Angelica Pinon, a 34 y.o.  at 14w6d, presents for OB follow-up.  She is doing well today.  Denies loss of fluid, vaginal bleeding, and contractions.  Endorses fetal movements.    Objective  BP Readings from Last 3 Encounters:   24 104/68   24 120/72   24 120/60      Wt Readings from Last 3 Encounters:   24 84.4 kg (186 lb)   24 85.3 kg (188 lb)   24 87.8 kg (193 lb 9.6 oz)      Total weight gain this pregnancy: Not found.    General: Awake, alert, no apparent distress  Respiratory: No increased work of breathing  Abdomen: Fundus soft and nontender  Extremity: Nontender, no edema    A/P  Diagnoses and all orders for this visit:    1. 14 weeks gestation of pregnancy (Primary)  Overview:  -PNL: IOB labs reviewed,plan for GBS at 36wga  -Pap: Normal last year  -Genetics: cfDNA low fetal fraction, then low risk, anatomy Us @20  -Imm: titers pending, tdap @ 28 weeks  -Contraception: _  -Birthplan: _  -Care coordination: accepts blood transfusions, no latex allergy, call schedule reviewed         Orders:  -     POC Urinalysis Dipstick    2. Vitamin D deficiency    3. Congenital duplication of uterus    4. History of  delivery affecting pregnancy    5. Mild intermittent asthma without complication        Labor precautions reviewed  No follow-ups on file.    Diana Rivera MD

## 2024-06-27 ENCOUNTER — ROUTINE PRENATAL (OUTPATIENT)
Dept: OBSTETRICS AND GYNECOLOGY | Age: 34
End: 2024-06-27
Payer: COMMERCIAL

## 2024-06-27 VITALS — SYSTOLIC BLOOD PRESSURE: 118 MMHG | WEIGHT: 187.6 LBS | BODY MASS INDEX: 29.38 KG/M2 | DIASTOLIC BLOOD PRESSURE: 68 MMHG

## 2024-06-27 DIAGNOSIS — O44.42 LOW-LYING PLACENTA IN SECOND TRIMESTER: ICD-10-CM

## 2024-06-27 DIAGNOSIS — Z13.89 SCREENING FOR BLOOD OR PROTEIN IN URINE: ICD-10-CM

## 2024-06-27 DIAGNOSIS — E55.9 VITAMIN D DEFICIENCY: ICD-10-CM

## 2024-06-27 DIAGNOSIS — Z3A.18 18 WEEKS GESTATION OF PREGNANCY: Primary | ICD-10-CM

## 2024-06-27 DIAGNOSIS — Q51.28 CONGENITAL DUPLICATION OF UTERUS: ICD-10-CM

## 2024-06-27 DIAGNOSIS — J45.20 MILD INTERMITTENT ASTHMA WITHOUT COMPLICATION: ICD-10-CM

## 2024-06-27 DIAGNOSIS — O34.219 HISTORY OF CESAREAN DELIVERY AFFECTING PREGNANCY: ICD-10-CM

## 2024-06-27 LAB
GLUCOSE UR STRIP-MCNC: NEGATIVE MG/DL
PROT UR STRIP-MCNC: NEGATIVE MG/DL

## 2024-06-27 PROCEDURE — 0502F SUBSEQUENT PRENATAL CARE: CPT

## 2024-06-27 NOTE — PROGRESS NOTES
Chief Complaint   Patient presents with    Routine Prenatal Visit     Ob f/u & anatomy u/s- 18w5d pt feeling well     Angelica Pinon, a 34 y.o.  at 18w5d, presents for OB follow-up.  Patient of Dr. Rivera. She is doing well today.  Denies loss of fluid, vaginal bleeding, and contractions.  Endorses fetal movements.  Incomplete anatomy today - f/u heart and nose/lip next visit, visualized anatomy appears normal at this time.   Low lying placenta measuring 1.17 cm from internal cervical os.   Didelphic uterus pregnancy in right    Objective  BP Readings from Last 3 Encounters:   24 118/68   24 104/68   24 120/72      Wt Readings from Last 3 Encounters:   24 85.1 kg (187 lb 9.6 oz)   24 84.4 kg (186 lb)   24 85.3 kg (188 lb)      Total weight gain this pregnancy: Not found.    Review of systems:   Gen: negative  CV:     negative  GI: negative  :   negative and good fetal movement noted   MS:    negative  Neuro: negative  Pul: negative    Physical Exam  Constitutional:       General: She is not in acute distress.  Cardiovascular:      Rate and Rhythm: Normal rate and regular rhythm.   Pulmonary:      Effort: Pulmonary effort is normal.      Breath sounds: Normal breath sounds.   Abdominal:      Palpations: Abdomen is soft.      Tenderness: There is no abdominal tenderness.   Skin:     General: Skin is warm.   Neurological:      Mental Status: She is alert and oriented to person, place, and time.   Psychiatric:         Mood and Affect: Mood normal.         Behavior: Behavior normal.         Thought Content: Thought content normal.         Judgment: Judgment normal.         A/P  Diagnoses and all orders for this visit:    1. 18 weeks gestation of pregnancy (Primary)  Overview:  -PNL: IOB labs reviewed,plan for GBS at 36wga  -Pap: Normal last year  -Genetics: cfDNA low fetal fraction, then low risk, anatomy Us @20  -Imm: titers pending, tdap @ 28 weeks  -Contraception:  _  -Birthplan: _  -Care coordination: accepts blood transfusions, no latex allergy, call schedule reviewed           2. Low-lying placenta in second trimester    3. Congenital duplication of uterus    4. Screening for blood or protein in urine  -     POC Urinalysis Dipstick    5. History of  delivery affecting pregnancy    6. Vitamin D deficiency    7. Mild intermittent asthma without complication      Repeat anatomy at next visit - f/u heart and nose/lip. Also monitor low-lying placenta at next US.   Labor precautions reviewed  Return in about 1 month (around 2024) for OB f/u and repeat anatomy, with Dr. Rievra.    Jaci Akins PA-C

## 2024-07-26 ENCOUNTER — ROUTINE PRENATAL (OUTPATIENT)
Dept: OBSTETRICS AND GYNECOLOGY | Age: 34
End: 2024-07-26
Payer: COMMERCIAL

## 2024-07-26 VITALS — BODY MASS INDEX: 30.23 KG/M2 | DIASTOLIC BLOOD PRESSURE: 78 MMHG | SYSTOLIC BLOOD PRESSURE: 110 MMHG | WEIGHT: 193 LBS

## 2024-07-26 DIAGNOSIS — O34.219 HISTORY OF CESAREAN DELIVERY AFFECTING PREGNANCY: ICD-10-CM

## 2024-07-26 DIAGNOSIS — J45.20 MILD INTERMITTENT ASTHMA WITHOUT COMPLICATION: ICD-10-CM

## 2024-07-26 DIAGNOSIS — Z3A.22 22 WEEKS GESTATION OF PREGNANCY: Primary | ICD-10-CM

## 2024-07-26 DIAGNOSIS — Q51.28 CONGENITAL DUPLICATION OF UTERUS: ICD-10-CM

## 2024-07-26 LAB
COLOR UR: YELLOW
GLUCOSE UR STRIP-MCNC: NEGATIVE MG/DL
PROT UR STRIP-MCNC: NEGATIVE MG/DL

## 2024-07-26 NOTE — PROGRESS NOTES
Angelica Pinon, a 34 y.o.  at 22w6d, presents for OB follow-up.  She is doing well today.  Denies loss of fluid, vaginal bleeding, and contractions.  Endorses fetal movements.    Objective  BP Readings from Last 3 Encounters:   24 110/78   24 118/68   24 104/68      Wt Readings from Last 3 Encounters:   24 87.5 kg (193 lb)   24 85.1 kg (187 lb 9.6 oz)   24 84.4 kg (186 lb)      Total weight gain this pregnancy: Not found.    General: Awake, alert, no apparent distress  Respiratory: No increased work of breathing  Abdomen: Fundus soft and nontender  Extremity: Nontender, no edema    A/P  Diagnoses and all orders for this visit:    1. 22 weeks gestation of pregnancy (Primary)  Overview:  -PNL: IOB labs reviewed,plan for GBS at 36wga  -Pap: Normal last year  -Genetics: cfDNA low fetal fraction, then low risk, anatomy Us @20  -Imm: titers pending, tdap @ 28 weeks  -Contraception: _  -Birthplan: _  -Care coordination: accepts blood transfusions, no latex allergy, call schedule reviewed         Orders:  -     POC Urinalysis Dipstick    2. History of  delivery affecting pregnancy    3. Congenital duplication of uterus    4. Mild intermittent asthma without complication        Labor precautions reviewed  No follow-ups on file.    Diana Rivera MD

## 2024-08-23 ENCOUNTER — ROUTINE PRENATAL (OUTPATIENT)
Dept: OBSTETRICS AND GYNECOLOGY | Age: 34
End: 2024-08-23
Payer: COMMERCIAL

## 2024-08-23 VITALS — BODY MASS INDEX: 31.14 KG/M2 | WEIGHT: 198.8 LBS | SYSTOLIC BLOOD PRESSURE: 116 MMHG | DIASTOLIC BLOOD PRESSURE: 64 MMHG

## 2024-08-23 DIAGNOSIS — Z13.1 DIABETES MELLITUS SCREENING: ICD-10-CM

## 2024-08-23 DIAGNOSIS — O34.219 HISTORY OF CESAREAN DELIVERY AFFECTING PREGNANCY: ICD-10-CM

## 2024-08-23 DIAGNOSIS — Z13.0 SCREENING FOR IRON DEFICIENCY ANEMIA: ICD-10-CM

## 2024-08-23 DIAGNOSIS — Q51.28 DIDELPHIC UTERUS: ICD-10-CM

## 2024-08-23 DIAGNOSIS — Z13.89 SCREENING FOR BLOOD OR PROTEIN IN URINE: ICD-10-CM

## 2024-08-23 DIAGNOSIS — O09.292 IUGR (INTRAUTERINE GROWTH RESTRICTION) IN PRIOR PREGNANCY, PREGNANT, SECOND TRIMESTER: ICD-10-CM

## 2024-08-23 DIAGNOSIS — R35.0 URINARY FREQUENCY: ICD-10-CM

## 2024-08-23 DIAGNOSIS — Z3A.26 26 WEEKS GESTATION OF PREGNANCY: Primary | ICD-10-CM

## 2024-08-23 PROBLEM — E55.9 VITAMIN D DEFICIENCY: Status: RESOLVED | Noted: 2017-02-06 | Resolved: 2024-08-23

## 2024-08-23 LAB
BILIRUB BLD-MCNC: NEGATIVE MG/DL
CLARITY, POC: CLEAR
COLOR UR: YELLOW
GLUCOSE UR STRIP-MCNC: NEGATIVE MG/DL
KETONES UR QL: ABNORMAL
LEUKOCYTE EST, POC: ABNORMAL
NITRITE UR-MCNC: NEGATIVE MG/ML
PH UR: 6.5 [PH] (ref 5–8)
PROT UR STRIP-MCNC: NEGATIVE MG/DL
RBC # UR STRIP: NEGATIVE /UL
SP GR UR: 1.02 (ref 1–1.03)
UROBILINOGEN UR QL: ABNORMAL

## 2024-08-23 NOTE — PROGRESS NOTES
Angelica Pinon, a 34 y.o.  at 26w6d, presents for OB follow-up.  She is doing well today.  Denies loss of fluid, vaginal bleeding, and contractions.  Endorses fetal movements.  She reports urinary frequency but denies hematuria and dysuria.    Objective  BP Readings from Last 3 Encounters:   24 116/64   24 110/78   24 118/68      Wt Readings from Last 3 Encounters:   24 90.2 kg (198 lb 12.8 oz)   24 87.5 kg (193 lb)   24 85.1 kg (187 lb 9.6 oz)      Total weight gain this pregnancy: Not found.    General: Awake, alert, no apparent distress  Respiratory: No increased work of breathing  Abdomen: Fundus soft and nontender  Extremity: Nontender, no edema    A/P  Diagnoses and all orders for this visit:    1. 26 weeks gestation of pregnancy (Primary)  Overview:  -PNL: 1hr GTT/CBC/RPR today, plan for GBS at 36wga  -Pap: NIL/HPV neg 22  -Genetics: cfDNA low fetal fraction, then low risk, anatomy Us normal  -Imm: RI, VI, tdap @ 28 weeks  -Contraception: _  -Birthplan: _  -Care coordination: accepts blood transfusions, no latex allergy, call schedule reviewed         Orders:  -     POC Urinalysis Dipstick  -     Gestational Screen 1 Hr ([facility])  -     CBC (No Diff)  -     Treponema pallidum AB w/Reflex RPR    2. History of  delivery affecting pregnancy  Overview:  -G1: IUGR with single umbilical artery, failed IOL, failure to dilate, PCS  -Interested in TOLAC.  Discussed that she is not an ideal candidate but she can discuss further with Dr. Rivera.  Discussed  success calculator to help make that decision.      3. IUGR (intrauterine growth restriction) in prior pregnancy, pregnant, second trimester  Overview:  Plan for growth US at next visit      4. Screening for blood or protein in urine  -     POC Urinalysis Dipstick    5. Screening for iron deficiency anemia  -     CBC (No Diff)    6. Diabetes mellitus screening  -     Gestational Screen 1 Hr ([facility])    7.  Urinary frequency    8. Didelphic uterus      UA pos leuks/ketones > sent for culture    Labor precautions reviewed  Return in about 3 weeks (around 9/13/2024) for Appt with growth Us in 3 wks then appt q2 wks x 3 then weekly x 3.    Andreia Almonte MD     Angelo Prakash

## 2024-08-25 LAB
BACTERIA UR CULT: NORMAL
BACTERIA UR CULT: NORMAL
ERYTHROCYTE [DISTWIDTH] IN BLOOD BY AUTOMATED COUNT: 13 % (ref 12.3–15.4)
GLUCOSE 1H P 50 G GLC PO SERPL-MCNC: 128 MG/DL (ref 65–139)
HCT VFR BLD AUTO: 37.9 % (ref 34–46.6)
HGB BLD-MCNC: 12.1 G/DL (ref 12–15.9)
MCH RBC QN AUTO: 27.6 PG (ref 26.6–33)
MCHC RBC AUTO-ENTMCNC: 31.9 G/DL (ref 31.5–35.7)
MCV RBC AUTO: 86.5 FL (ref 79–97)
PLATELET # BLD AUTO: 218 10*3/MM3 (ref 140–450)
RBC # BLD AUTO: 4.38 10*6/MM3 (ref 3.77–5.28)
TREPONEMA PALLIDUM IGG+IGM AB [PRESENCE] IN SERUM OR PLASMA BY IMMUNOASSAY: NON REACTIVE
WBC # BLD AUTO: 10 10*3/MM3 (ref 3.4–10.8)

## 2024-09-13 ENCOUNTER — ROUTINE PRENATAL (OUTPATIENT)
Dept: OBSTETRICS AND GYNECOLOGY | Age: 34
End: 2024-09-13
Payer: COMMERCIAL

## 2024-09-13 VITALS — BODY MASS INDEX: 31.73 KG/M2 | WEIGHT: 202.6 LBS | SYSTOLIC BLOOD PRESSURE: 124 MMHG | DIASTOLIC BLOOD PRESSURE: 70 MMHG

## 2024-09-13 DIAGNOSIS — Z3A.29 29 WEEKS GESTATION OF PREGNANCY: Primary | ICD-10-CM

## 2024-09-13 DIAGNOSIS — Z13.89 SCREENING FOR BLOOD OR PROTEIN IN URINE: ICD-10-CM

## 2024-09-13 DIAGNOSIS — O34.219 HISTORY OF CESAREAN DELIVERY AFFECTING PREGNANCY: ICD-10-CM

## 2024-09-13 DIAGNOSIS — Q51.28 DIDELPHIC UTERUS: ICD-10-CM

## 2024-09-13 DIAGNOSIS — O09.292 IUGR (INTRAUTERINE GROWTH RESTRICTION) IN PRIOR PREGNANCY, PREGNANT, SECOND TRIMESTER: ICD-10-CM

## 2024-09-13 LAB
CLARITY, POC: CLEAR
COLOR UR: YELLOW
GLUCOSE UR STRIP-MCNC: NEGATIVE MG/DL
PROT UR STRIP-MCNC: NEGATIVE MG/DL

## 2024-09-13 NOTE — PROGRESS NOTES
Angelica Pinon, a 34 y.o.  at 29w6d, presents for OB follow-up.  She is doing well today.  Denies loss of fluid, vaginal bleeding, and contractions.  Endorses fetal movements.    Objective  BP Readings from Last 3 Encounters:   24 124/70   24 116/64   24 110/78      Wt Readings from Last 3 Encounters:   24 91.9 kg (202 lb 9.6 oz)   24 90.2 kg (198 lb 12.8 oz)   24 87.5 kg (193 lb)      Total weight gain this pregnancy: 10.3 kg (22 lb 9.6 oz)    General: Awake, alert, no apparent distress  Respiratory: No increased work of breathing  Abdomen: Fundus soft and nontender  Extremity: Nontender, no edema    A/P  Diagnoses and all orders for this visit:    1. 29 weeks gestation of pregnancy (Primary)  Overview:  -PNL: plan for GBS at 36wga  -Pap: NIL/HPV neg 22  -Genetics: cfDNA low fetal fraction, then low risk, anatomy Us normal  -Imm: RI, VI, tdap today  -Contraception: _  -Birthplan: _  -Care coordination: accepts blood transfusions, no latex allergy, call schedule reviewed         Orders:  -     POC Urinalysis Dipstick    2. Screening for blood or protein in urine  -     POC Urinalysis Dipstick    3. Didelphic uterus  Overview:  Growth US normal today      4. History of  delivery affecting pregnancy  Overview:  -G1: IUGR with single umbilical artery, failed IOL, failure to dilate, PCS  -Interested in TOLAC.  Discussed that she is not an ideal candidate but she can discuss further with Dr. Rivera.  Discussed  success calculator to help make that decision.      5. IUGR (intrauterine growth restriction) in prior pregnancy, pregnant, second trimester  Overview:  Growth US normal today      Other orders  -     Tdap Vaccine Greater Than or Equal To 8yo IM        Labor precautions reviewed  Return in about 2 weeks (around 2024) for Next f/u with Nicole.    Andreia Almonte MD

## 2024-09-27 ENCOUNTER — ROUTINE PRENATAL (OUTPATIENT)
Dept: OBSTETRICS AND GYNECOLOGY | Age: 34
End: 2024-09-27
Payer: COMMERCIAL

## 2024-09-27 VITALS — WEIGHT: 204 LBS | DIASTOLIC BLOOD PRESSURE: 84 MMHG | SYSTOLIC BLOOD PRESSURE: 118 MMHG | BODY MASS INDEX: 31.95 KG/M2

## 2024-09-27 DIAGNOSIS — M54.9 BACK PAIN AFFECTING PREGNANCY IN THIRD TRIMESTER: ICD-10-CM

## 2024-09-27 DIAGNOSIS — O99.891 BACK PAIN AFFECTING PREGNANCY IN THIRD TRIMESTER: ICD-10-CM

## 2024-09-27 DIAGNOSIS — J45.20 MILD INTERMITTENT ASTHMA WITHOUT COMPLICATION: ICD-10-CM

## 2024-09-27 DIAGNOSIS — O09.292 IUGR (INTRAUTERINE GROWTH RESTRICTION) IN PRIOR PREGNANCY, PREGNANT, SECOND TRIMESTER: ICD-10-CM

## 2024-09-27 DIAGNOSIS — O34.219 HISTORY OF CESAREAN DELIVERY AFFECTING PREGNANCY: ICD-10-CM

## 2024-09-27 DIAGNOSIS — Z3A.31 31 WEEKS GESTATION OF PREGNANCY: Primary | ICD-10-CM

## 2024-09-27 LAB
CLARITY, POC: CLEAR
COLOR UR: YELLOW
GLUCOSE UR STRIP-MCNC: NEGATIVE MG/DL
PROT UR STRIP-MCNC: ABNORMAL MG/DL

## 2024-09-27 RX ORDER — SODIUM CHLORIDE 0.9 % (FLUSH) 0.9 %
10 SYRINGE (ML) INJECTION AS NEEDED
OUTPATIENT
Start: 2024-09-27

## 2024-09-27 RX ORDER — MORPHINE SULFATE 10 MG/ML
2 INJECTION INTRAMUSCULAR; INTRAVENOUS; SUBCUTANEOUS
OUTPATIENT
Start: 2024-09-27 | End: 2024-10-07

## 2024-09-27 RX ORDER — KETOROLAC TROMETHAMINE 15 MG/ML
30 INJECTION, SOLUTION INTRAMUSCULAR; INTRAVENOUS ONCE
OUTPATIENT
Start: 2024-09-27 | End: 2024-09-27

## 2024-09-27 RX ORDER — ONDANSETRON 2 MG/ML
4 INJECTION INTRAMUSCULAR; INTRAVENOUS EVERY 6 HOURS PRN
OUTPATIENT
Start: 2024-09-27

## 2024-09-27 RX ORDER — OXYTOCIN/0.9 % SODIUM CHLORIDE 30/500 ML
250 PLASTIC BAG, INJECTION (ML) INTRAVENOUS CONTINUOUS
OUTPATIENT
Start: 2024-09-27 | End: 2024-09-27

## 2024-09-27 RX ORDER — SODIUM CHLORIDE 0.9 % (FLUSH) 0.9 %
10 SYRINGE (ML) INJECTION EVERY 12 HOURS SCHEDULED
OUTPATIENT
Start: 2024-09-27

## 2024-09-27 RX ORDER — OXYTOCIN/0.9 % SODIUM CHLORIDE 30/500 ML
999 PLASTIC BAG, INJECTION (ML) INTRAVENOUS ONCE
OUTPATIENT
Start: 2024-09-27

## 2024-09-27 RX ORDER — SODIUM CHLORIDE 9 MG/ML
40 INJECTION, SOLUTION INTRAVENOUS AS NEEDED
OUTPATIENT
Start: 2024-09-27

## 2024-09-27 RX ORDER — METHYLERGONOVINE MALEATE 0.2 MG/ML
200 INJECTION INTRAVENOUS ONCE AS NEEDED
OUTPATIENT
Start: 2024-09-27

## 2024-09-27 RX ORDER — MISOPROSTOL 100 UG/1
800 TABLET ORAL AS NEEDED
OUTPATIENT
Start: 2024-09-27

## 2024-09-27 RX ORDER — ACETAMINOPHEN 500 MG
1000 TABLET ORAL ONCE
OUTPATIENT
Start: 2024-09-27 | End: 2024-09-27

## 2024-09-27 RX ORDER — LIDOCAINE HYDROCHLORIDE 10 MG/ML
0.5 INJECTION, SOLUTION INFILTRATION; PERINEURAL ONCE AS NEEDED
OUTPATIENT
Start: 2024-09-27

## 2024-09-27 RX ORDER — CARBOPROST TROMETHAMINE 250 UG/ML
250 INJECTION, SOLUTION INTRAMUSCULAR AS NEEDED
OUTPATIENT
Start: 2024-09-27

## 2024-09-27 RX ORDER — SODIUM CHLORIDE, SODIUM LACTATE, POTASSIUM CHLORIDE, CALCIUM CHLORIDE 600; 310; 30; 20 MG/100ML; MG/100ML; MG/100ML; MG/100ML
125 INJECTION, SOLUTION INTRAVENOUS CONTINUOUS
OUTPATIENT
Start: 2024-09-27

## 2024-09-27 RX ORDER — ONDANSETRON 4 MG/1
4 TABLET, ORALLY DISINTEGRATING ORAL EVERY 6 HOURS PRN
OUTPATIENT
Start: 2024-09-27

## 2024-10-09 ENCOUNTER — HOSPITAL ENCOUNTER (INPATIENT)
Facility: HOSPITAL | Age: 34
LOS: 3 days | Discharge: HOME | DRG: 833 | End: 2024-10-12
Attending: STUDENT IN AN ORGANIZED HEALTH CARE EDUCATION/TRAINING PROGRAM | Admitting: OBSTETRICS & GYNECOLOGY
Payer: COMMERCIAL

## 2024-10-09 ENCOUNTER — TELEPHONE (OUTPATIENT)
Dept: OBSTETRICS AND GYNECOLOGY | Age: 34
End: 2024-10-09
Payer: COMMERCIAL

## 2024-10-09 PROBLEM — O60.03 PRETERM LABOR IN THIRD TRIMESTER WITHOUT DELIVERY: Status: ACTIVE | Noted: 2024-10-09

## 2024-10-09 PROBLEM — Z3A.31 31 WEEKS GESTATION OF PREGNANCY: Status: RESOLVED | Noted: 2024-04-05 | Resolved: 2024-10-09

## 2024-10-09 PROBLEM — Z34.90 PREGNANCY: Status: ACTIVE | Noted: 2024-10-09

## 2024-10-09 LAB
A1 MICROGLOB PLACENTAL VAG QL: NEGATIVE
ABO GROUP BLD: NORMAL
ALBUMIN SERPL-MCNC: 3.4 G/DL (ref 3.5–5.2)
ALBUMIN/GLOB SERPL: 1 G/DL
ALP SERPL-CCNC: 122 U/L (ref 39–117)
ALT SERPL W P-5'-P-CCNC: 11 U/L (ref 1–33)
ANION GAP SERPL CALCULATED.3IONS-SCNC: 14 MMOL/L (ref 5–15)
AST SERPL-CCNC: 20 U/L (ref 1–32)
BACTERIA UR QL AUTO: ABNORMAL /HPF
BASOPHILS # BLD AUTO: 0.06 10*3/MM3 (ref 0–0.2)
BASOPHILS NFR BLD AUTO: 0.5 % (ref 0–1.5)
BILIRUB SERPL-MCNC: 0.3 MG/DL (ref 0–1.2)
BILIRUB UR QL STRIP: NEGATIVE
BLD GP AB SCN SERPL QL: NEGATIVE
BLOODY SPECIMEN?: NO
BUN SERPL-MCNC: 5 MG/DL (ref 6–20)
BUN/CREAT SERPL: 7.8 (ref 7–25)
CALCIUM SPEC-SCNC: 8.8 MG/DL (ref 8.6–10.5)
CHLORIDE SERPL-SCNC: 103 MMOL/L (ref 98–107)
CLARITY UR: ABNORMAL
CO2 SERPL-SCNC: 18 MMOL/L (ref 22–29)
COLOR UR: YELLOW
CREAT SERPL-MCNC: 0.64 MG/DL (ref 0.57–1)
DEPRECATED RDW RBC AUTO: 42 FL (ref 37–54)
EGFRCR SERPLBLD CKD-EPI 2021: 120.6 ML/MIN/1.73
EOSINOPHIL # BLD AUTO: 0.19 10*3/MM3 (ref 0–0.4)
EOSINOPHIL NFR BLD AUTO: 1.4 % (ref 0.3–6.2)
ERYTHROCYTE [DISTWIDTH] IN BLOOD BY AUTOMATED COUNT: 13.1 % (ref 12.3–15.4)
FIBRONECTIN FETAL VAG QL: POSITIVE
GLOBULIN UR ELPH-MCNC: 3.5 GM/DL
GLUCOSE SERPL-MCNC: 66 MG/DL (ref 65–99)
GLUCOSE UR STRIP-MCNC: NEGATIVE MG/DL
HCT VFR BLD AUTO: 38.5 % (ref 34–46.6)
HGB BLD-MCNC: 12.1 G/DL (ref 12–15.9)
HGB UR QL STRIP.AUTO: NEGATIVE
HYALINE CASTS UR QL AUTO: ABNORMAL /LPF
IMM GRANULOCYTES # BLD AUTO: 0.12 10*3/MM3 (ref 0–0.05)
IMM GRANULOCYTES NFR BLD AUTO: 0.9 % (ref 0–0.5)
KETONES UR QL STRIP: ABNORMAL
LEUKOCYTE ESTERASE UR QL STRIP.AUTO: ABNORMAL
LYMPHOCYTES # BLD AUTO: 2.51 10*3/MM3 (ref 0.7–3.1)
LYMPHOCYTES NFR BLD AUTO: 18.9 % (ref 19.6–45.3)
MCH RBC QN AUTO: 27.4 PG (ref 26.6–33)
MCHC RBC AUTO-ENTMCNC: 31.4 G/DL (ref 31.5–35.7)
MCV RBC AUTO: 87.3 FL (ref 79–97)
MONOCYTES # BLD AUTO: 0.61 10*3/MM3 (ref 0.1–0.9)
MONOCYTES NFR BLD AUTO: 4.6 % (ref 5–12)
NEUTROPHILS NFR BLD AUTO: 73.7 % (ref 42.7–76)
NEUTROPHILS NFR BLD AUTO: 9.77 10*3/MM3 (ref 1.7–7)
NITRITE UR QL STRIP: NEGATIVE
NRBC BLD AUTO-RTO: 0 /100 WBC (ref 0–0.2)
PH UR STRIP.AUTO: 6.5 [PH] (ref 5–8)
PLATELET # BLD AUTO: 232 10*3/MM3 (ref 140–450)
PMV BLD AUTO: 10.4 FL (ref 6–12)
POTASSIUM SERPL-SCNC: 3.2 MMOL/L (ref 3.5–5.2)
PROT SERPL-MCNC: 6.9 G/DL (ref 6–8.5)
PROT UR QL STRIP: NEGATIVE
RBC # BLD AUTO: 4.41 10*6/MM3 (ref 3.77–5.28)
RBC # UR STRIP: ABNORMAL /HPF
REF LAB TEST METHOD: ABNORMAL
RH BLD: POSITIVE
SODIUM SERPL-SCNC: 135 MMOL/L (ref 136–145)
SP GR UR STRIP: 1.01 (ref 1–1.03)
SQUAMOUS #/AREA URNS HPF: ABNORMAL /HPF
T&S EXPIRATION DATE: NORMAL
TREPONEMA PALLIDUM IGG+IGM AB [PRESENCE] IN SERUM OR PLASMA BY IMMUNOASSAY: NORMAL
UROBILINOGEN UR QL STRIP: ABNORMAL
WBC # UR STRIP: ABNORMAL /HPF
WBC NRBC COR # BLD AUTO: 13.26 10*3/MM3 (ref 3.4–10.8)

## 2024-10-09 PROCEDURE — 85025 COMPLETE CBC W/AUTO DIFF WBC: CPT | Performed by: OBSTETRICS & GYNECOLOGY

## 2024-10-09 PROCEDURE — 86901 BLOOD TYPING SEROLOGIC RH(D): CPT | Performed by: OBSTETRICS & GYNECOLOGY

## 2024-10-09 PROCEDURE — 80053 COMPREHEN METABOLIC PANEL: CPT | Performed by: OBSTETRICS & GYNECOLOGY

## 2024-10-09 PROCEDURE — 59025 FETAL NON-STRESS TEST: CPT

## 2024-10-09 PROCEDURE — 25810000003 LACTATED RINGERS PER 1000 ML: Performed by: OBSTETRICS & GYNECOLOGY

## 2024-10-09 PROCEDURE — 84112 EVAL AMNIOTIC FLUID PROTEIN: CPT | Performed by: OBSTETRICS & GYNECOLOGY

## 2024-10-09 PROCEDURE — 87086 URINE CULTURE/COLONY COUNT: CPT | Performed by: OBSTETRICS & GYNECOLOGY

## 2024-10-09 PROCEDURE — 99221 1ST HOSP IP/OBS SF/LOW 40: CPT | Performed by: OBSTETRICS & GYNECOLOGY

## 2024-10-09 PROCEDURE — 81001 URINALYSIS AUTO W/SCOPE: CPT | Performed by: OBSTETRICS & GYNECOLOGY

## 2024-10-09 PROCEDURE — 86780 TREPONEMA PALLIDUM: CPT | Performed by: OBSTETRICS & GYNECOLOGY

## 2024-10-09 PROCEDURE — 82731 ASSAY OF FETAL FIBRONECTIN: CPT | Performed by: OBSTETRICS & GYNECOLOGY

## 2024-10-09 PROCEDURE — 86900 BLOOD TYPING SEROLOGIC ABO: CPT | Performed by: OBSTETRICS & GYNECOLOGY

## 2024-10-09 PROCEDURE — 87081 CULTURE SCREEN ONLY: CPT | Performed by: OBSTETRICS & GYNECOLOGY

## 2024-10-09 PROCEDURE — 25010000002 BETAMETHASONE ACET & SOD PHOS PER 4 MG: Performed by: OBSTETRICS & GYNECOLOGY

## 2024-10-09 PROCEDURE — 86850 RBC ANTIBODY SCREEN: CPT | Performed by: OBSTETRICS & GYNECOLOGY

## 2024-10-09 PROCEDURE — 25010000002 TERBUTALINE PER 1 MG: Performed by: OBSTETRICS & GYNECOLOGY

## 2024-10-09 PROCEDURE — 99285 EMERGENCY DEPT VISIT HI MDM: CPT | Performed by: OBSTETRICS & GYNECOLOGY

## 2024-10-09 RX ORDER — NIFEDIPINE 10 MG/1
10 CAPSULE ORAL ONCE
Status: COMPLETED | OUTPATIENT
Start: 2024-10-09 | End: 2024-10-09

## 2024-10-09 RX ORDER — MAGNESIUM SULFATE HEPTAHYDRATE 40 MG/ML
2 INJECTION, SOLUTION INTRAVENOUS CONTINUOUS
Status: DISCONTINUED | OUTPATIENT
Start: 2024-10-09 | End: 2024-10-10

## 2024-10-09 RX ORDER — TERBUTALINE SULFATE 1 MG/ML
0.25 INJECTION, SOLUTION SUBCUTANEOUS ONCE
Status: COMPLETED | OUTPATIENT
Start: 2024-10-09 | End: 2024-10-09

## 2024-10-09 RX ORDER — POTASSIUM CHLORIDE 750 MG/1
40 TABLET, FILM COATED, EXTENDED RELEASE ORAL EVERY 4 HOURS
Status: COMPLETED | OUTPATIENT
Start: 2024-10-09 | End: 2024-10-10

## 2024-10-09 RX ORDER — SODIUM CHLORIDE, SODIUM LACTATE, POTASSIUM CHLORIDE, CALCIUM CHLORIDE 600; 310; 30; 20 MG/100ML; MG/100ML; MG/100ML; MG/100ML
75 INJECTION, SOLUTION INTRAVENOUS CONTINUOUS
Status: DISCONTINUED | OUTPATIENT
Start: 2024-10-09 | End: 2024-10-10

## 2024-10-09 RX ORDER — BETAMETHASONE SODIUM PHOSPHATE AND BETAMETHASONE ACETATE 3; 3 MG/ML; MG/ML
12 INJECTION, SUSPENSION INTRA-ARTICULAR; INTRALESIONAL; INTRAMUSCULAR; SOFT TISSUE EVERY 24 HOURS
Status: COMPLETED | OUTPATIENT
Start: 2024-10-09 | End: 2024-10-10

## 2024-10-09 RX ORDER — ACETAMINOPHEN 325 MG/1
650 TABLET ORAL EVERY 6 HOURS PRN
Status: DISCONTINUED | OUTPATIENT
Start: 2024-10-09 | End: 2024-10-12 | Stop reason: HOSPADM

## 2024-10-09 RX ORDER — SODIUM CHLORIDE 0.9 % (FLUSH) 0.9 %
10 SYRINGE (ML) INJECTION EVERY 12 HOURS SCHEDULED
Status: DISCONTINUED | OUTPATIENT
Start: 2024-10-09 | End: 2024-10-12 | Stop reason: HOSPADM

## 2024-10-09 RX ORDER — SODIUM CHLORIDE 0.9 % (FLUSH) 0.9 %
10 SYRINGE (ML) INJECTION AS NEEDED
Status: DISCONTINUED | OUTPATIENT
Start: 2024-10-09 | End: 2024-10-12 | Stop reason: HOSPADM

## 2024-10-09 RX ADMIN — TERBUTALINE SULFATE 0.25 MG: 1 INJECTION, SOLUTION SUBCUTANEOUS at 18:19

## 2024-10-09 RX ADMIN — SODIUM CHLORIDE, POTASSIUM CHLORIDE, SODIUM LACTATE AND CALCIUM CHLORIDE 999 ML/HR: 600; 310; 30; 20 INJECTION, SOLUTION INTRAVENOUS at 18:19

## 2024-10-09 RX ADMIN — BETAMETHASONE ACETATE AND BETAMETHASONE SODIUM PHOSPHATE 12 MG: 3; 3 INJECTION, SUSPENSION INTRA-ARTICULAR; INTRALESIONAL; INTRAMUSCULAR; SOFT TISSUE at 17:37

## 2024-10-09 RX ADMIN — ACETAMINOPHEN 325MG 650 MG: 325 TABLET ORAL at 23:50

## 2024-10-09 RX ADMIN — POTASSIUM CHLORIDE 40 MEQ: 750 TABLET, EXTENDED RELEASE ORAL at 20:26

## 2024-10-09 RX ADMIN — NIFEDIPINE 10 MG: 10 CAPSULE ORAL at 14:35

## 2024-10-09 NOTE — OBED NOTES
"[facility]  Angelica Pinon  : 1990  MRN: [id number]  CSN: [id number]    OB ED Provider Note    Subjective   Chief Complaint   Patient presents with    Leaking Fluid     RILEY - Patient felt a gush of fluid around noon. +FM, -VB. -CTX     Angelica Pinon is a 34 y.o. year old  with an Estimated Date of Delivery: 24 currently at 33w4d presenting with possible ROM. She had a large gush of fluid around noon today, soaking her leggings. She has not had significant fluid loss since. She is feeling some irregular CTX. No VB noted. FM is present.     Prenatal care has been with Dr. Rivera.  It has been complicated by previous C/S - (desires repeat ).    OB History    Para Term  AB Living   2 1 1 0 0 1   SAB IAB Ectopic Molar Multiple Live Births   0 0 0 0 0 1      # Outcome Date GA Lbr Brian/2nd Weight Sex Type Anes PTL Lv   2 Current            1 Term 21 37w0d  2268 g (5 lb) M CS-Unspec EPI  TERESA      Name: Branden     Past Medical History:   Diagnosis Date    Overweight (BMI 25.0-29.9) 10/28/2023    Anxiety     Asthma     Depression Last year    I took medicine and changed eating/exercise habits and am off meds and feeling better now    Didelphic uterus     Headache     Intrauterine growth restriction (IUGR) affecting care of mother     Migraine     Urinary tract infection     Varicella      Past Surgical History:   Procedure Laterality Date     SECTION      WISDOM TOOTH EXTRACTION       No current facility-administered medications for this encounter.    No Known Allergies  Social History    Tobacco Use      Smoking status: Never      Smokeless tobacco: Never    Review of Systems   Gastrointestinal:  Positive for abdominal pain.   Genitourinary:  Positive for vaginal discharge.   All other systems reviewed and are negative.        Objective   /74 (BP Location: Right arm, Patient Position: Sitting)   Pulse 86   Resp 18   Ht 167.6 cm (66\")   LMP 02/10/2024 " (Approximate)   SpO2 99%   BMI 32.93 kg/m²   General: well developed; well nourished  no acute distress  mentation appropriate   Abdomen: soft, non-tender; no masses  gravid    FHT's: reactive and category 1      Cervix: was checked (by RN): 0.5 cm / 30 % / -2   Presentation: cephalic   Contractions: every 1-2 minutes   Chest: Unlabored respirations    CV:  RRR   Ext:   No C/C/E   Back: CVA tenderness is deferred bilateral        Prenatal Labs  Lab Results   Component Value Date    HGB 12.1 2024    RUBELLAABIGG 1.86 2024    HEPBSAG Negative 2024    ABSCRN Negative 2024    WAI9PSW1 Non Reactive 2024     2024    URINECX Final report 2024    CHLAMNAA Negative 2024    NGONORRHON Negative 2024       Current Labs Reviewed   UA:    Lab Results   Component Value Date    SQUAMEPIUA 3-6 (A) 10/09/2024    SPECGRAVUR 1.011 10/09/2024    KETONESU Trace (A) 10/09/2024    BLOODU Negative 10/09/2024    LEUKOCYTESUR Large (3+) (A) 10/09/2024    NITRITEU Negative 10/09/2024    RBCUA 0-2 10/09/2024    WBCUA Too Numerous to Count (A) 10/09/2024    BACTERIA 4+ (A) 10/09/2024     ROM+ negative  fFN+     Assessment   IUP at 33w4d   CTX- negative ROM+ but + fFN, so cannot r/o PTL. She also has a surgically scarred uterus to consider.     Plan   Admit for 24 hour obs. Begin BMZ, continue nifedipine, and start slow IV fluids.     Charisse Grubbs MD  10/9/2024  14:28 EDT

## 2024-10-09 NOTE — TELEPHONE ENCOUNTER
PT 33wks 4d was at the library with her son. PT said a gush of liquid came out. PT has black underwear on and black pants so does not know the color the liquid was. PT tried to wipe and no color. PT also tried to smell it but there was no scent. PT states no blood and no cramping at this time.

## 2024-10-09 NOTE — H&P
[facility]  Obstetric History and Physical    Chief Complaint   Patient presents with    Leaking Fluid     RILEY - Patient felt a gush of fluid around noon. +FM, -VB. -CTX       Subjective     Patient is a 34 y.o. female  currently at 33w4d, who presented earlier this afternoon with complaints of possible loss of fluid.  ROM plus returned negative.  Patient was noted to be selina regularly although she did not appreciate the contractions.  Fetal fibronectin was sent and returned positive.  After oral hydration and Procardia patient was still selina.  On report,  I was told her cervix was fingertip but on recheck her cervical exam was 2 cm and 70% effaced.  During her admission as she was having her blood drawn, patient had a syncopal episode.  At that time the fetal heart rate did decrease to the 70s to 80s for approximately 5 minutes.  Terbutaline was given and fetal heart rate recovered.  She was induced with her last pregnancy at 37 weeks due to intrauterine growth restriction.    Her prenatal care is complicated by didelphys uterus and prior  section.Her previous obstetric/gynecological history is noted for  prior  section. .    The following portions of the patients history were reviewed and updated as appropriate: current medications, allergies, past medical history, past surgical history, past family history, past social history, and problem list .       Prenatal Information:  Prenatal Results       Initial Prenatal Labs       Test Value Reference Range Date Time    Hemoglobin  14.1 g/dL 11.1 - 15.9 2418    Hematocrit  43.1 % 34.0 - 46.6 24 0918    Platelets  231 x10E3/uL 150 - 450 2418    Rubella IgG  1.86 index Immune >0.99 24    Hepatitis B SAg  Negative  Negative 24    Hepatitis C Ab        RPR  Non Reactive  Non Reactive 24    T. Pallidum Ab   Non-Reactive  Non-Reactive 10/09/24 1821       Non Reactive  Non  Reactive 08/23/24 0921    ABO  O   10/09/24 1821    Rh  Positive   10/09/24 1821    Antibody Screen  Negative  Negative 04/05/24 0918    HIV  Non Reactive  Non Reactive 04/05/24 0918    Urine Culture  Final report   08/23/24 0927       Final report   04/05/24 1318    Gonorrhea  Negative  Negative 04/05/24 1311    Chlamydia  Negative  Negative 04/05/24 1311    TSH        HgB A1c         Varicella IgG  1,111 index Immune >165 04/05/24 0918    Hemoglobinopathy Fractionation  Comment   04/05/24 0918    Hemoglobinopathy (genetic testing)        Cystic fibrosis                   Fetal testing        Test Value Reference Range Date Time    NIPT        MSAFP        AFP-4                  2nd and 3rd Trimester       Test Value Reference Range Date Time    Hemoglobin (repeated)  12.1 g/dL 12.0 - 15.9 10/09/24 1821       12.1 g/dL 12.0 - 15.9 08/23/24 0921    Hematocrit (repeated)  38.5 % 34.0 - 46.6 10/09/24 1821       37.9 % 34.0 - 46.6 08/23/24 0921    Platelets   232 10*3/mm3 140 - 450 10/09/24 1821       218 10*3/mm3 140 - 450 08/23/24 0921       231 x10E3/uL 150 - 450 04/05/24 0918    1 hour GTT   128 mg/dL 65 - 139 08/23/24 0921    Antibody Screen (repeated)  Negative   10/09/24 1821    3rd TM syphilis scrn (repeated)  RPR         3rd TM syphilis scrn (repeated) TP-Ab  Non-Reactive  Non-Reactive 10/09/24 1821       Non Reactive  Non Reactive 08/23/24 0921    3rd TM syphilis screen TB-Ab (FTA)  Non-Reactive  Non-Reactive 10/09/24 1821       Non Reactive  Non Reactive 08/23/24 0921    Syphilis cascade test TP-Ab (EIA)        Syphilis cascade TPPA        GTT Fasting        GTT 1 Hr        GTT 2 Hr        GTT 3 Hr        Group B Strep                  Other testing        Test Value Reference Range Date Time    Parvo IgG         CMV IgG                   Drug Screening       Test Value Reference Range Date Time    Amphetamine Screen        Barbiturate Screen        Benzodiazepine Screen        Methadone Screen         Phencyclidine Screen        Opiates Screen        THC Screen        Cocaine Screen        Propoxyphene Screen        Buprenorphine Screen        Methamphetamine Screen        Oxycodone Screen        Tricyclic Antidepressants Screen                  Legend    ^: Historical                          External Prenatal Results       Pregnancy Outside Results - Transcribed From Office Records - See Scanned Records For Details       Test Value Date Time    ABO  O  10/09/24 1821    Rh  Positive  10/09/24 1821    Antibody Screen  Negative  10/09/24 1821       Negative  04/05/24 0918    Varicella IgG  1,111 index 04/05/24 0918    Rubella  1.86 index 04/05/24 0918    Hgb  12.1 g/dL 10/09/24 1821       12.1 g/dL 08/23/24 0921       14.1 g/dL 04/05/24 0918    Hct  38.5 % 10/09/24 1821       37.9 % 08/23/24 0921       43.1 % 04/05/24 0918    HgB A1c        1h GTT  128 mg/dL 08/23/24 0921    3h GTT Fasting       3h GTT 1 hour       3h GTT 2 hour       3h GTT 3 hour        Gonorrhea (discrete)  Negative  04/05/24 1311    Chlamydia (discrete)  Negative  04/05/24 1311    RPR  Non Reactive  04/05/24 0918    Syphils cascade: TP-Ab (FTA)  Non-Reactive  10/09/24 1821       Non Reactive  08/23/24 0921    TP-Ab  Non-Reactive  10/09/24 1821       Non Reactive  08/23/24 0921    TP-Ab (EIA)       TPPA       HBsAg  Negative  04/05/24 0918    Herpes Simplex Virus PCR       Herpes Simplex VIrus Culture       HIV  Non Reactive  04/05/24 0918    Hep C RNA Quant PCR       Hep C Antibody       AFP       NIPT       Cystic Fibroisis        Group B Strep       GBS Susceptibility to Clindamycin       GBS Susceptibility to Erythromycin       Fetal Fibronectin  Positive  10/09/24 1408    Genetic Testing, Maternal Blood                 Drug Screening       Test Value Date Time    Urine Drug Screen       Amphetamine Screen       Barbiturate Screen       Benzodiazepine Screen       Methadone Screen       Phencyclidine Screen       Opiates Screen       THC  Screen       Cocaine Screen       Propoxyphene Screen       Buprenorphine Screen       Methamphetamine Screen       Oxycodone Screen       Tricyclic Antidepressants Screen                 Legend    ^: Historical                             Past OB History:     OB History    Para Term  AB Living   2 1 1 0 0 1   SAB IAB Ectopic Molar Multiple Live Births   0 0 0 0 0 1      # Outcome Date GA Lbr Brian/2nd Weight Sex Type Anes PTL Lv   2 Current            1 Term 21 37w0d  2268 g (5 lb) M CS-Unspec EPI  TERESA      Name: Branden       Past Medical History: Past Medical History:   Diagnosis Date    Anxiety     Asthma     Depression Last year    I took medicine and changed eating/exercise habits and am off meds and feeling better now    Didelphic uterus     Headache     Intrauterine growth restriction (IUGR) affecting care of mother     Migraine     Overweight (BMI 25.0-29.9) 10/28/2023    Urinary tract infection     Varicella       Past Surgical History Past Surgical History:   Procedure Laterality Date     SECTION      WISDOM TOOTH EXTRACTION        Family History: Family History   Problem Relation Age of Onset    No Known Problems Mother     Diabetes Father     Ulcerative colitis Sister     Seizures Sister     No Known Problems Brother     No Known Problems Maternal Grandmother     Cancer Maternal Grandfather     Breast cancer Neg Hx     Ovarian cancer Neg Hx     Uterine cancer Neg Hx     Colon cancer Neg Hx       Social History:  reports that she has never smoked. She has never used smokeless tobacco.   reports no history of alcohol use.   reports no history of drug use.        Review of Systems   Constitutional:  Negative for chills, fatigue and fever.   Gastrointestinal:  Negative for abdominal pain.   Genitourinary:  Negative for pelvic pain, vaginal bleeding, vaginal discharge and vaginal pain.   All other systems reviewed and are negative.        Objective     Vital Signs Range for the last  24 hours  Temperature: Temp:  [97.9 °F (36.6 °C)] 97.9 °F (36.6 °C)   Temp Source: Temp src: Oral   BP: BP: (117-138)/(64-74) 121/67   Pulse: Heart Rate:  [73-95] 88   Respirations: Resp:  [18] 18   SPO2: SpO2:  [99 %-100 %] 100 %   O2 Amount (l/min): Flow (L/min):  [10] 10   O2 Devices Device (Oxygen Therapy): room air   Weight:       Physical Examination: General appearance - alert, well appearing, and in no distress  Abdomen - soft, nontender, nondistended, no masses or organomegaly  Musculoskeletal - no joint tenderness, deformity or swelling  Extremities - peripheral pulses normal, no pedal edema, no clubbing or cyanosis  Skin - normal coloration and turgor, no rashes, no suspicious skin lesions noted    Presentation: Vertex    Cervix: Exam by:     Dilation: Cervical Dilation (cm): 2   Effacement: Cervical Effacement: 70%   Station:       Fetal Heart Rate Assessment   Method: Fetal HR Assessment Method: external   Beats/min: Fetal HR (beats/min): 145   Baseline: Fetal HR Baseline: normal range   Variability: Fetal HR Variability: moderate (amplitude range 6 to 25 bpm)   Accels: Fetal HR Accelerations: greater than/equal to 15 bpm, lasting at least 15 seconds   Decels: Fetal HR Decelerations: absent   Tracing Category:       Uterine Assessment   Method: Method: palpation, external tocotransducer   Frequency (min): Contraction Frequency (Minutes): 1-3.5   Ctx Count in 10 min: Contractions in 10 Minutes: 4   Duration:     Intensity: Contraction Intensity: mild by palpation   Intensity by IUPC:     Resting Tone: Uterine Resting Tone: soft by palpation   Resting Tone by IUPC:     Andrae Units:       Laboratory Results:   Results from last 7 days   Lab Units 10/09/24  1821   WBC 10*3/mm3 13.26*   HEMOGLOBIN g/dL 12.1   HEMATOCRIT % 38.5   PLATELETS 10*3/mm3 232     Results from last 7 days   Lab Units 10/09/24  1821   SODIUM mmol/L 135*   POTASSIUM mmol/L 3.2*   CHLORIDE mmol/L 103   CO2 mmol/L 18.0*   BUN mg/dL  5*   CREATININE mg/dL 0.64   CALCIUM mg/dL 8.8   BILIRUBIN mg/dL 0.3   ALK PHOS U/L 122*   ALT (SGPT) U/L 11   AST (SGOT) U/L 20   GLUCOSE mg/dL 66         Assessment & Plan       History of  delivery affecting pregnancy    Didelphic uterus     labor in third trimester without delivery      Assessment & Plan    Assessment:  1.  Intrauterine pregnancy at 33w4d gestation with reactive, reassuring fetal status.    2.   labor  without ROM  3.  Obstetrical history significant for is remarkable for prior  section and intrauterine growth restriction  4.  GBS status: Pending  Plan:  1. fetal and uterine monitoring  continuously, tocolysis with magnesium sulfate, IV steroids for fetal benefit, Fetal Ultrasound for growth, and  Consultation  2. Plan of care has been reviewed with patient and nurse  3.  Risks, benefits of treatment plan have been discussed.  4.  All questions have been answered.  5.  Unsure at this time if she desires trial of labor after  or repeat  section.        Estephania Vanegas MD  10/9/2024  19:26 EDT

## 2024-10-10 ENCOUNTER — TELEPHONE (OUTPATIENT)
Dept: OBSTETRICS AND GYNECOLOGY | Age: 34
End: 2024-10-10
Payer: COMMERCIAL

## 2024-10-10 ENCOUNTER — APPOINTMENT (OUTPATIENT)
Dept: ULTRASOUND IMAGING | Facility: HOSPITAL | Age: 34
DRG: 833 | End: 2024-10-10
Payer: COMMERCIAL

## 2024-10-10 PROBLEM — O09.293 HISTORY OF INTRAUTERINE GROWTH RESTRICTION IN PRIOR PREGNANCY, CURRENTLY PREGNANT IN THIRD TRIMESTER: Status: ACTIVE | Noted: 2024-08-23

## 2024-10-10 PROBLEM — O09.299 HX OF PREECLAMPSIA, PRIOR PREGNANCY, CURRENTLY PREGNANT: Status: ACTIVE | Noted: 2024-10-10

## 2024-10-10 LAB
BACTERIA SPEC AEROBE CULT: NORMAL
POTASSIUM SERPL-SCNC: 4.3 MMOL/L (ref 3.5–5.2)

## 2024-10-10 PROCEDURE — 76819 FETAL BIOPHYS PROFIL W/O NST: CPT

## 2024-10-10 PROCEDURE — 25010000002 BETAMETHASONE ACET & SOD PHOS PER 4 MG: Performed by: OBSTETRICS & GYNECOLOGY

## 2024-10-10 PROCEDURE — 84132 ASSAY OF SERUM POTASSIUM: CPT | Performed by: OBSTETRICS & GYNECOLOGY

## 2024-10-10 PROCEDURE — 76819 FETAL BIOPHYS PROFIL W/O NST: CPT | Performed by: OBSTETRICS & GYNECOLOGY

## 2024-10-10 PROCEDURE — 59025 FETAL NON-STRESS TEST: CPT

## 2024-10-10 PROCEDURE — 25810000003 LACTATED RINGERS PER 1000 ML: Performed by: OBSTETRICS & GYNECOLOGY

## 2024-10-10 PROCEDURE — 76816 OB US FOLLOW-UP PER FETUS: CPT

## 2024-10-10 PROCEDURE — 99232 SBSQ HOSP IP/OBS MODERATE 35: CPT | Performed by: OBSTETRICS & GYNECOLOGY

## 2024-10-10 PROCEDURE — 25010000002 MAGNESIUM SULFATE 20 GM/500ML SOLUTION: Performed by: OBSTETRICS & GYNECOLOGY

## 2024-10-10 PROCEDURE — 76816 OB US FOLLOW-UP PER FETUS: CPT | Performed by: OBSTETRICS & GYNECOLOGY

## 2024-10-10 RX ORDER — NIFEDIPINE 10 MG/1
10 CAPSULE ORAL EVERY 8 HOURS SCHEDULED
Status: COMPLETED | OUTPATIENT
Start: 2024-10-10 | End: 2024-10-11

## 2024-10-10 RX ADMIN — SODIUM CHLORIDE, POTASSIUM CHLORIDE, SODIUM LACTATE AND CALCIUM CHLORIDE 75 ML/HR: 600; 310; 30; 20 INJECTION, SOLUTION INTRAVENOUS at 00:41

## 2024-10-10 RX ADMIN — BETAMETHASONE ACETATE AND BETAMETHASONE SODIUM PHOSPHATE 12 MG: 3; 3 INJECTION, SUSPENSION INTRA-ARTICULAR; INTRALESIONAL; INTRAMUSCULAR; SOFT TISSUE at 18:15

## 2024-10-10 RX ADMIN — Medication 10 ML: at 21:07

## 2024-10-10 RX ADMIN — NIFEDIPINE 10 MG: 10 CAPSULE, LIQUID FILLED ORAL at 11:09

## 2024-10-10 RX ADMIN — NIFEDIPINE 10 MG: 10 CAPSULE, LIQUID FILLED ORAL at 18:15

## 2024-10-10 RX ADMIN — POTASSIUM CHLORIDE 40 MEQ: 750 TABLET, EXTENDED RELEASE ORAL at 00:28

## 2024-10-10 RX ADMIN — MAGNESIUM SULFATE HEPTAHYDRATE 2 G/HR: 40 INJECTION, SOLUTION INTRAVENOUS at 03:56

## 2024-10-10 NOTE — TELEPHONE ENCOUNTER
"S/w pt and her spouse, prior-authorization is not needed for her insurance for maternity admission, [facility] prefix. If she stays past 96 hours for  delivery then the hospital is responsible for getting authorization at that time.  Most major insurance plans follow the federal mandate allowing 48hrs stay for vaginal deliver, and 96hrs for .    \"Prospective routine maternity requests cannot be entered for planned routine maternity admissions. Please wait until the member has been admitted for delivery to enter the request into [employer]\"  "

## 2024-10-10 NOTE — PROGRESS NOTES
MATERNAL FETAL MEDICINE Consult Note    Dear Dr Diana Rivera, *:    Thank you for your kind referral of Angelica Pinon.  As you know, she is a 34 y.o.   33w5d gestation (Estimated Date of Delivery: 24). This is a consult.      Her antepartum course is complicated by:  PTL  Hx of CS  Didelphic uterus    Aneuploidy Screening: low risk    HPI: Today, she denies headache, blurry vision, RUQ pain. No vaginal bleeding, no contractions.     Review of History:  Past Medical History:   Diagnosis Date    Anxiety     Asthma     Depression Last year    I took medicine and changed eating/exercise habits and am off meds and feeling better now    Didelphic uterus     Headache     Intrauterine growth restriction (IUGR) affecting care of mother     Migraine     Overweight (BMI 25.0-29.9) 10/28/2023    Pregnancy 10/9/2024    Urinary tract infection     Varicella      Past Surgical History:   Procedure Laterality Date     SECTION      WISDOM TOOTH EXTRACTION         Social History     Socioeconomic History    Marital status:      Spouse name: Andrei    Number of children: 1   Tobacco Use    Smoking status: Never    Smokeless tobacco: Never   Vaping Use    Vaping status: Never Used   Substance and Sexual Activity    Alcohol use: No    Drug use: Never    Sexual activity: Yes     Partners: Male     Birth control/protection: OCP     Family History   Problem Relation Age of Onset    No Known Problems Mother     Diabetes Father     Ulcerative colitis Sister     Seizures Sister     No Known Problems Brother     No Known Problems Maternal Grandmother     Cancer Maternal Grandfather     Breast cancer Neg Hx     Ovarian cancer Neg Hx     Uterine cancer Neg Hx     Colon cancer Neg Hx       No Known Allergies   No current facility-administered medications on file prior to encounter.     Current Outpatient Medications on File Prior to Encounter   Medication Sig Dispense Refill    Prenatal Vit-Fe Fumarate-FA  (prenatal vitamin 28-0.8) 28-0.8 MG tablet tablet Take 1 tablet by mouth Daily. 30 tablet 11    doxylamine (UNISOM) 25 MG tablet Take 1 tablet by mouth At Night As Needed for Nausea. (Patient not taking: Reported on 5/31/2024) 30 tablet 0    ondansetron (Zofran) 4 MG tablet Take 1 tablet by mouth Every 4 (Four) Hours As Needed for Nausea or Vomiting. (Patient not taking: Reported on 7/26/2024) 60 tablet 3    promethazine (PHENERGAN) 12.5 MG tablet Take 1 tablet by mouth Every 6 (Six) Hours As Needed for Nausea or Vomiting. (Patient not taking: Reported on 5/31/2024) 30 tablet 0    vitamin B-6 (PYRIDOXINE) 25 MG tablet Take 1 tablet by mouth 3 times a day. (Patient not taking: Reported on 5/31/2024) 90 tablet 3        Past obstetric, gynecological, medical, surgical, family and social history reviewed.  Relevant lab work and imaging reviewed.    Review of systems  Constitutional:  denies fever, chills, malaise.   ENT/Mouth:  denies sore throat, tinnitus  Eyes: denies vision changes/pain  CV:  denies chest pain  Respiratory:  denies cough/SOB  GI:  denies N/V, diarrhea, abdominal pain.    :   denies dysuria  Skin:  denies lesions or pruritus   Neuro:  denies weakness, focal neurologic symptoms    Vitals:    10/10/24 0845 10/10/24 1108 10/10/24 1109 10/10/24 1453   BP:  118/74 118/74 111/64   BP Location:       Patient Position:       Pulse: 94 81 82 81   Resp:   16 16   Temp:       TempSrc:       SpO2: 99%      Height:           PHYSICAL EXAM   GENERAL: Not in acute distress, AAOx3, pleasant  CARDIO: regular rate and rhythm  PULM: symmetric chest rise, speaking in complete sentences without difficulty  NEURO: awake, alert and oriented to person, place, and time  ABDOMINAL: No fundal tenderness, no rebound or guarding, gravid  EXTREMITIES: no bilateral lower extremity edema/tenderness  SKIN: Warm, well-perfused      ULTRASOUND   Please view full ultrasound note on Imaging tab in [facility].  Cephalic  presentation.  Posterior placenta.  LENI 9 cm, which is normal.   EFW 2396 g (55%, AC 67%)  BPP 8/8  Normal appearing limited anatomy.      ASSESSMENT/COUNSELIN y.o.   33w5d gestation (Estimated Date of Delivery: 24).       -Pregnancy  [ X ] stable  [   ] improving [  ] worsening    There are no diagnoses linked to this encounter.     PTL   birth is defined as birth between 20 0/7 weeks of gestation and 36 6/7 weeks of gestation. The diagnosis of  labor generally is based on clinical criteria of regular uterine contractions accompanied by a change in cervical dilation/effacement or regular contractions and cervical dilation of at least 2 cm. Less than 10% of women with the clinical diagnosis of  labor actually give birth within 1 week of having  labor.  Her FFN is positive, but I would expect it to be at 2 cm and it is more useful between 0-2 cm for predicting women who are at a higher risk of progression. Thankfully, her PTL symptoms have subsided.      She is on magnesium which I would discontinue at this time.  It is only a mild tocolytic and she does not meet criteria for neuroprotection at >32 weeks.  I discussed this with her and she is amenable to doing procardia 10 q8 through 24 hours after 2nd dose of steroids.  If she remains quiescent on tocometer with reactive/reassuring fetus this evening around 5 or 6 pm, would consider taking her of CEFM and doing TID 1 hour.  If she feels further contractions she certainly should be put back on the monitor.  I would recommend watching her 12-24 hours at least after Procardia comes off and rechecking cervix to ensure stability before considering discharge.      Summary of Plan  -S/p Magnesium, now on procardia 10 mg q8 hours.  Would stop 24 hours after 2nd dose of steroids  -Watch pt 12-24 hours after procardia discontinuation to make sure remains quiescent.   -Would recheck cervix and ensure stability prior to  discharge.  -TID 1 hour NST if fetus/toco reassuring this evening.      Thank you for the consult and opportunity to care for this patient.  Please feel free to reach out with any questions or concerns.      I spent 25 minutes caring for this patient on this date of service. This time includes time spent by me in the following activities: preparing for the visit, reviewing tests, obtaining and/or reviewing a separately obtained history, performing a medically appropriate examination and/or evaluation, counseling and educating the patient/family/caregiver and independently interpreting results and communicating that information with the patient/family/caregiver with greater than 50% spent in counseling and coordination of care.       I spent 4 minutes on the separately reported service of US imaging not included in the time used to support the E/M service also reported today.      Stephanie Reyes MD FACOG  Maternal Fetal Medicine-[facility]  Office: [phone]  [email or web address]

## 2024-10-10 NOTE — TELEPHONE ENCOUNTER
"  Caller: Angelica Pinon \"Latoya\"    Relationship: Self    Best call back number: [phone]    What test/procedure requested: IN-HOSPITALIZATION FOR DELIVERY    When is it needed: AS SOON AS POSSIBLE    Where is the test/procedure going to be performed: [facility]     Additional information or concerns: PT'S INSURANCE IS NEEDING TO GET A PRE-AUTHORIZATION FOR IN-HOSPITALIZATION FOR DELIVERY    "

## 2024-10-10 NOTE — PLAN OF CARE
Goal Outcome Evaluation:  Plan of Care Reviewed With: patient, spouse        Progress: no change  Outcome Evaluation: Patient admitted for  labor and started on magnesium sulfate.  Contractions very infrequent now and patient only feeling mild cramping.  Patient medicated for headache with Tylenol and reported relief.  Potassium protocol initiated for K 3.2.  Two doses potassium given per protocol and repeat potassium level drawn at 0430.  Second dose of BMZ to be given this afternoon.

## 2024-10-10 NOTE — PROGRESS NOTES
[facility]   Obstetrics and Gynecology   Antepartum Note      10/10/2024    Name:Angelica Pinon    MR#:[id number]    Progress note                        HD:1    Subjective     Chief Complaint   Patient presents with    Leaking Fluid     RILEY - Patient felt a gush of fluid around noon. +FM, -VB. -CTX       34 y.o. yo Female  at 33w5d admitted with  labor. This morning her contractions have resolved and she is comfortable. She notes normal fetal movement. No loss of fluid, vaginal bleeding  Hx primary CS in G1 at [facility], reviewed notes and presented with SROM and was 6 cm but made no further progress with augmentation, also there were some fetal heart rate decelerations but seems that failure to progress was primary dx    Patient Active Problem List   Diagnosis    Asthma    History of  delivery affecting pregnancy    Didelphic uterus    History of intrauterine growth restriction in prior pregnancy, currently pregnant in third trimester     labor in third trimester without delivery    Pregnancy    Hx of preeclampsia, prior pregnancy, currently pregnant        Objective    Vitals  Temp:  Temp:  [97.8 °F (36.6 °C)-98.2 °F (36.8 °C)] 98.2 °F (36.8 °C)  Temp src: Oral  BP:  BP: ()/(56-84) 112/66  Pulse:  Heart Rate:  [] 88  RR:   Resp:  [14-18] 16    Wt Readings from Last 3 Encounters:   24 92.5 kg (204 lb)   24 91.9 kg (202 lb 9.6 oz)   24 90.2 kg (198 lb 12.8 oz)       Body mass index is 32.93 kg/m².    Exam:    Physical Exam  Well, no distress  Regular, nonlabored breathing  Fundus is soft nontender, no palpable contractions  No pedal edema and no calf tenderness noted    I/O last 3 completed shifts:  In: 4094.1 [P.O.:2100; I.V.:.1]  Out: 3550 [Urine:3550]    Results from last 7 days   Lab Units 10/09/24  1821   WBC 10*3/mm3 13.26*   HEMOGLOBIN g/dL 12.1   HEMATOCRIT % 38.5   PLATELETS 10*3/mm3 232     Results from last 7 days   Lab Units  10/10/24  0430 10/09/24  1821   SODIUM mmol/L  --  135*   POTASSIUM mmol/L 4.3 3.2*   CHLORIDE mmol/L  --  103   CO2 mmol/L  --  18.0*   BUN mg/dL  --  5*   CREATININE mg/dL  --  0.64   CALCIUM mg/dL  --  8.8   BILIRUBIN mg/dL  --  0.3   ALK PHOS U/L  --  122*   ALT (SGPT) U/L  --  11   AST (SGOT) U/L  --  20   GLUCOSE mg/dL  --  66       Fetal Tracing:  Reactive, Category I         Assessment/Plan  1.  Intrauterine pregnancy at 33w5d with  labor.  Appreciate consultation from Dr. Reyes this morning, plan to discontinue magnesium and transition to nifedipine 10 mg every 8 hours.  She will get her second dose of betamethasone this evening.  Currently her  labor appears to have arrested.  2.  History of prior  delivery-I discussed with her option of TOLAC versus repeat  and she is a bit uncertain at this time.  Discussed risks and benefits of both approaches and she is considering.  3.  History of intrauterine growth restriction-fetal growth has been normal, repeat ultrasound performed this morning but results not yet completed  4.  Didelphic uterus-may be contributing to her  labor    Continue inpatient management      Radha Tristan MD  10/10/2024 10:05 EDT

## 2024-10-10 NOTE — PLAN OF CARE
Goal Outcome Evaluation:           Progress: improving  Outcome Evaluation: VSS. Pt denies pain, LOF , and VB. Pt reports active fetal movement. CEFM can de-escalate to TID for 1 hour after pt receives 2nd dose of BMZ. MFM consult complete this AM. Pt visiting with spouse today.

## 2024-10-10 NOTE — CONSULTS
PRENATAL CONSULTATION NOTE     DATE: 10/9/2024  MRN: [id number]      Patient Name: Angelica Pinon  YOB: 1990    Requesting Physician:  Guilherme    EDC: 2024, by Ultrasound    GA: 33w4d    Estimated fetal weight: unk    Reason for Consultation: potential premature single at 33 weeks gestation    Maternal History: 34 y.o.  with  labor    Consult:  maternal grandfather, maternal grandmother, father, and mother available for discussion.  We reviewed the fetal and  aspects of the above conditions to include: estimated survival rate, estimated intact survival rate, respiratory distress syndrome, transient tachypnea of the , beneficial effects of steroids, early onset of sepsis, late onset sepsis, apnea of prematurity, feeding difficulty, temperature instability, jaundice, and hypoglycemia    Plan for Resuscitation: full resuscitation    Offered estimation of prognosis of potential length of infant hospitalization.     We will plan to attend delivery if requested.     I discussed the importance of providing human milk for all infants, especially premature infants. The patient does plan on providing pumped breast milk and/or nursing when appropriate.    I also reviewed policies and procedures for NICU/ICN admission and reviewed structure and function of [facility].    Thank you for allowing us to participate in the care of this patient. [facility] is always available for follow-up consultation as indicated. Please do not hesitate to call for additional questions or issues.    Additional Comments: mother presented with suspicions of ROM, currently intact however in PTL with advancement in cervical change. Receiving BMZ, on mag sulfate. Expecting male infant-named JIMBO Kyle   Nurse Practitioner  [facility] - Neonatology  [facility]  10/09/24 @ 23:06 EDT    Consult note reviewed and  electronically signed on 10/9/2024 at 23:06 EDT    INITIAL CONSULT:  A total of 45 minutes were spent on counseling and coordination of care including discussion with physicians and nursing, and reviewing the findings and recommendations with the patient and her family of which 50 percent were spent face-to-face with the patient during this encounter.    Thank you for requesting this consult. Please contact the [facility] on-call  Provider for any further questions/concerns, by calling the NICU at extension [phone].        DISCLAIMER:       At [facility], we believe that sharing information builds trust and better relationships. You are receiving this note because you or your baby are receiving care at [facility] or recently visited. It is possible you will see health information before a provider has talked with you about it. This kind of information can be easy to misunderstand. To help you fully understand what it means for your health, we urge you to discuss this note with your provider.

## 2024-10-11 LAB — BACTERIA SPEC AEROBE CULT: NORMAL

## 2024-10-11 PROCEDURE — 59025 FETAL NON-STRESS TEST: CPT | Performed by: OBSTETRICS & GYNECOLOGY

## 2024-10-11 PROCEDURE — 59025 FETAL NON-STRESS TEST: CPT

## 2024-10-11 PROCEDURE — 90791 PSYCH DIAGNOSTIC EVALUATION: CPT | Performed by: SOCIAL WORKER

## 2024-10-11 PROCEDURE — 99232 SBSQ HOSP IP/OBS MODERATE 35: CPT | Performed by: STUDENT IN AN ORGANIZED HEALTH CARE EDUCATION/TRAINING PROGRAM

## 2024-10-11 PROCEDURE — 59025 FETAL NON-STRESS TEST: CPT | Performed by: STUDENT IN AN ORGANIZED HEALTH CARE EDUCATION/TRAINING PROGRAM

## 2024-10-11 RX ORDER — NIFEDIPINE 10 MG/1
10 CAPSULE ORAL EVERY 8 HOURS SCHEDULED
Status: COMPLETED | OUTPATIENT
Start: 2024-10-11 | End: 2024-10-11

## 2024-10-11 RX ADMIN — NIFEDIPINE 10 MG: 10 CAPSULE ORAL at 09:37

## 2024-10-11 RX ADMIN — NIFEDIPINE 10 MG: 10 CAPSULE ORAL at 18:07

## 2024-10-11 RX ADMIN — ACETAMINOPHEN 325MG 650 MG: 325 TABLET ORAL at 00:17

## 2024-10-11 RX ADMIN — Medication 10 ML: at 09:10

## 2024-10-11 RX ADMIN — NIFEDIPINE 10 MG: 10 CAPSULE, LIQUID FILLED ORAL at 02:20

## 2024-10-11 RX ADMIN — Medication 10 ML: at 20:58

## 2024-10-11 NOTE — CONSULTS
[facility] evaluated 34-year-old female due to an [place] of 12th.  Patient is in the antepartum unit with some  labor.  Patient's  was in room with patient's permission.  At patient states she has a history of preeclampsia and that her first child ,who is age 3 was in the NICU for a few weeks.  Patient states she became depressed about a year after he was born and took some antidepressants back then but was able to get off them after getting some exercise ,improving her eating excetra.  Patient rates her current depression as a 1 or 2/10 and her current anxiety as a 4 or 5/10.  Patient states that anxiety is over  the concern about  labor and whether the baby will have to be in NICU if they come early.  Patient denies any SI and denies any history of attempts.  Patient has never had any inpatient psychiatric care but had some counseling in college.  Patient states she has never had Mio treatment and does not use substances.  Patient states her sleep has not been great as she has had to get up and go to the bathroom several times throughout the night.  Patient states her appetite is okay.    Patient lives in a house with her  of 10 years and they have a 3-year-old son.  Patient has a good support system that includes her , parents and in-laws, all of whom are helping to care for the 3-year-old while she is in the hospital.  Patient states she has hobbies that help her deal with her stress levels.  Patient does not feel she needs any counseling at this point.  Access talked with patient about staying aware of any symptoms that might feel like depression or anxiety once the baby is born.  Access will follow for a few days.

## 2024-10-11 NOTE — PLAN OF CARE
Goal Outcome Evaluation:  Plan of Care Reviewed With: patient, spouse        Progress: improving  Outcome Evaluation: VSS.  RNST and active FM reported.  Frequent irritability but no contractions noted on NST.  Patient reported only felt one brief episode of mild cramping that lasted a few second but other than that moment has denied any cramping.  Patient slept well through the night with no complaints.  Patient denies LOF or vaginal bleeding.  One dose of tylenol was given for dull headache with relief reported.   at bedside for support.  Procardia to be continued as scheduled medication until 1815 tonight which will be 24 hours after second dose of BMZ was given.

## 2024-10-11 NOTE — NURSING NOTE
RN to room to assess patient to see if feeling cramping with irritability.  Patient reported felt one brief episode of dull cramping but only lasted a few seconds and has resolved now.  Patient has been orally hydrating with water and was instructed to notify RN if feeling any more cramping.  Patient verbalized understanding.

## 2024-10-11 NOTE — NON STRESS TEST
Angelica Pinon, a  at 33w6d with an TOMAS of 2024, by Ultrasound, was seen at [address] for a nonstress test.    Chief Complaint   Patient presents with    Leaking Fluid     RILEY - Patient felt a gush of fluid around noon. +FM, -VB. -CTX       Patient Active Problem List   Diagnosis    Asthma    History of  delivery affecting pregnancy    Didelphic uterus    History of intrauterine growth restriction in prior pregnancy, currently pregnant in third trimester     labor in third trimester without delivery    Pregnancy    Hx of preeclampsia, prior pregnancy, currently pregnant       Start Time: 1630  Stop Time: 1749    Interpretation A  Nonstress Test Interpretation A: Reactive

## 2024-10-11 NOTE — NON STRESS TEST
Angelica Pinon, a  at 33w6d with an TOMAS of 2024, by Ultrasound, was seen at [address] for a nonstress test.    Chief Complaint   Patient presents with    Leaking Fluid     RILEY - Patient felt a gush of fluid around noon. +FM, -VB. -CTX       Patient Active Problem List   Diagnosis    Asthma    History of  delivery affecting pregnancy    Didelphic uterus    History of intrauterine growth restriction in prior pregnancy, currently pregnant in third trimester     labor in third trimester without delivery    Pregnancy    Hx of preeclampsia, prior pregnancy, currently pregnant       Start Time: 905  Stop Time: 1018    Interpretation A  Nonstress Test Interpretation A: Reactive

## 2024-10-11 NOTE — NON STRESS TEST
Angelica Pinon, a  at 33w6d with an TOMAS of 2024, by Ultrasound, was seen at [address] for a nonstress test.    Chief Complaint   Patient presents with    Leaking Fluid     RILEY - Patient felt a gush of fluid around noon. +FM, -VB. -CTX       Patient Active Problem List   Diagnosis    Asthma    History of  delivery affecting pregnancy    Didelphic uterus    History of intrauterine growth restriction in prior pregnancy, currently pregnant in third trimester     labor in third trimester without delivery    Pregnancy    Hx of preeclampsia, prior pregnancy, currently pregnant       Start Time:   Stop Time: 10    Interpretation A  Nonstress Test Interpretation A: Reactive         Irritability noted frequently on NST but patient reported only felt one brief episode of dull cramping that lasted only a few seconds then resolved.  Patient denied cramping when RN to room to remove monitors.  No contractions noted.  Patient is orally hydrating with water and emptying her bladder frequently.  Patient to call RN if has any cramping through the night not resolved quickly by emptying her bladder so EFM and toco monitors can be placed again.

## 2024-10-12 VITALS
TEMPERATURE: 98.1 F | DIASTOLIC BLOOD PRESSURE: 71 MMHG | SYSTOLIC BLOOD PRESSURE: 108 MMHG | HEART RATE: 80 BPM | BODY MASS INDEX: 32.93 KG/M2 | RESPIRATION RATE: 16 BRPM | OXYGEN SATURATION: 99 % | HEIGHT: 66 IN

## 2024-10-12 PROCEDURE — 25010000002 INFLUENZA VIRUS VACC SPLIT PF 0.5 ML SUSPENSION PREFILLED SYRINGE: Performed by: STUDENT IN AN ORGANIZED HEALTH CARE EDUCATION/TRAINING PROGRAM

## 2024-10-12 PROCEDURE — G0008 ADMIN INFLUENZA VIRUS VAC: HCPCS | Performed by: STUDENT IN AN ORGANIZED HEALTH CARE EDUCATION/TRAINING PROGRAM

## 2024-10-12 PROCEDURE — 59025 FETAL NON-STRESS TEST: CPT

## 2024-10-12 PROCEDURE — 99238 HOSP IP/OBS DSCHRG MGMT 30/<: CPT | Performed by: STUDENT IN AN ORGANIZED HEALTH CARE EDUCATION/TRAINING PROGRAM

## 2024-10-12 PROCEDURE — 90656 IIV3 VACC NO PRSV 0.5 ML IM: CPT | Performed by: STUDENT IN AN ORGANIZED HEALTH CARE EDUCATION/TRAINING PROGRAM

## 2024-10-12 PROCEDURE — 59025 FETAL NON-STRESS TEST: CPT | Performed by: STUDENT IN AN ORGANIZED HEALTH CARE EDUCATION/TRAINING PROGRAM

## 2024-10-12 RX ADMIN — INFLUENZA A VIRUS A/VICTORIA/4897/2022 IVR-238 (H1N1) ANTIGEN (FORMALDEHYDE INACTIVATED), INFLUENZA A VIRUS A/CALIFORNIA/122/2022 SAN-022 (H3N2) ANTIGEN (FORMALDEHYDE INACTIVATED), AND INFLUENZA B VIRUS B/MICHIGAN/01/2021 ANTIGEN (FORMALDEHYDE INACTIVATED) 0.5 ML: 15; 15; 15 INJECTION, SUSPENSION INTRAMUSCULAR at 12:00

## 2024-10-12 RX ADMIN — Medication 10 ML: at 08:40

## 2024-10-12 NOTE — DISCHARGE SUMMARY
[facility]   Obstetrics and Gynecology   Antepartum Discharge Summary      Date of Admission: 10/9/2024    Date of Discharge:        Patient: Angelica Pinon      MR#:[id number]      Discharge Diagnosis:   Intrauterine pregnancy at 34w0d    History of  delivery affecting pregnancy    Didelphic uterus    History of intrauterine growth restriction in prior pregnancy, currently pregnant in third trimester     labor in third trimester without delivery    Pregnancy    Hx of preeclampsia, prior pregnancy, currently pregnant      Hospital Course  Patient is a 34 y.o. female  at 34w0d admitted for contractions and initially concern for ruptured membranes which was ruled out in OB ED.  Upon admission her cervical exam was /-2.  She received a course of betamethasone and was on Procardia but that was discontinued yesterday.She is stable this AM off of procardia. She was rechecked and her cervix was unchanged. She has f/u this upcoming Friday.   Chief Complaint   Patient presents with    Leaking Fluid     RILEY - Patient felt a gush of fluid around noon. +FM, -VB. -CTX         Condition on Discharge:  Stable    Vital Signs  Temp:  [98 °F (36.7 °C)-98.1 °F (36.7 °C)] 98.1 °F (36.7 °C)  Heart Rate:  [76-87] 80  Resp:  [15-17] 16  BP: (108-126)/(71-80) 108/71    Results from last 7 days   Lab Units 10/09/24  1821   WBC 10*3/mm3 13.26*   HEMOGLOBIN g/dL 12.1   HEMATOCRIT % 38.5   PLATELETS 10*3/mm3 232     Results from last 7 days   Lab Units 10/10/24  0430 10/09/24  1821   SODIUM mmol/L  --  135*   POTASSIUM mmol/L 4.3 3.2*   CHLORIDE mmol/L  --  103   CO2 mmol/L  --  18.0*   BUN mg/dL  --  5*   CREATININE mg/dL  --  0.64   CALCIUM mg/dL  --  8.8   BILIRUBIN mg/dL  --  0.3   ALK PHOS U/L  --  122*   ALT (SGPT) U/L  --  11   AST (SGOT) U/L  --  20   GLUCOSE mg/dL  --  66         Discharge Disposition  Home or Self Care    Discharge Medications     Discharge Medications        Continue These  Medications        Instructions Start Date   doxylamine 25 MG tablet  Commonly known as: UNISOM   25 mg, Oral, Nightly PRN      ondansetron 4 MG tablet  Commonly known as: Zofran   4 mg, Oral, Every 4 Hours PRN      prenatal vitamin 28-0.8 28-0.8 MG tablet tablet   1 tablet, Oral, Daily      promethazine 12.5 MG tablet  Commonly known as: PHENERGAN   12.5 mg, Oral, Every 6 Hours PRN      vitamin B-6 25 MG tablet  Commonly known as: PYRIDOXINE   25 mg, Oral, 3 times daily               Discharge Diet: Regular    Activity at Discharge:     Follow-up Appointments  Future Appointments   Date Time Provider Department Center   10/14/2024 10:00 AM Jacquelin Horner, PT  [facility]   10/24/2024 10:30 AM Jaci Akins PA-C [facility]   11/1/2024 10:30 AM Diana Rivera MD [facility]   11/8/2024 10:30 AM Diana Rivera MD [facility]   11/15/2024 10:30 AM Diana Rivera MD [facility]   11/22/2024 10:45 AM Diana Rivera MD [facility]           Prenatal labs/vax:   Immunization History   Administered Date(s) Administered    COVID-19 (PFIZER) Purple Cap Monovalent 08/30/2021, 09/24/2021    Flublok 18+yrs 10/20/2022, 10/12/2023    Fluzone (or Fluarix & Flulaval for VFC) >6mos 12/03/2020    Hep A / Hep B 05/21/2018    PPD Test 05/14/2013, 05/19/2015    Tdap 04/22/2021, 09/13/2024         External Prenatal Results       Pregnancy Outside Results - Transcribed From Office Records - See Scanned Records For Details       Test Value Date Time    ABO  O  10/09/24 1821    Rh  Positive  10/09/24 1821    Antibody Screen  Negative  10/09/24 1821       Negative  04/05/24 0918    Varicella IgG  1,111 index 04/05/24 0918    Rubella  1.86 index 04/05/24 0918    Hgb  12.1 g/dL 10/09/24 1821       12.1 g/dL 08/23/24 0921       14.1 g/dL 04/05/24 0918    Hct  38.5 % 10/09/24 1821       37.9 % 08/23/24 0921       43.1 % 04/05/24 0918    HgB A1c        1h GTT  128  mg/dL 08/23/24 0921    3h GTT Fasting       3h GTT 1 hour       3h GTT 2 hour       3h GTT 3 hour        Gonorrhea (discrete)  Negative  04/05/24 1311    Chlamydia (discrete)  Negative  04/05/24 1311    RPR  Non Reactive  04/05/24 0918    Syphils cascade: TP-Ab (FTA)  Non-Reactive  10/09/24 1821       Non Reactive  08/23/24 0921    TP-Ab  Non-Reactive  10/09/24 1821       Non Reactive  08/23/24 0921    TP-Ab (EIA)       TPPA       HBsAg  Negative  04/05/24 0918    Herpes Simplex Virus PCR       Herpes Simplex VIrus Culture       HIV  Non Reactive  04/05/24 0918    Hep C RNA Quant PCR       Hep C Antibody       AFP       NIPT       Cystic Fibroisis        Group B Strep  No Group B Streptococcus isolated  10/09/24 1740    GBS Susceptibility to Clindamycin       GBS Susceptibility to Erythromycin       Fetal Fibronectin  Positive  10/09/24 1408    Genetic Testing, Maternal Blood                 Drug Screening       Test Value Date Time    Urine Drug Screen       Amphetamine Screen       Barbiturate Screen       Benzodiazepine Screen       Methadone Screen       Phencyclidine Screen       Opiates Screen       THC Screen       Cocaine Screen       Propoxyphene Screen       Buprenorphine Screen       Methamphetamine Screen       Oxycodone Screen       Tricyclic Antidepressants Screen                 Legend    ^: Historical                              Diana Rivera MD  10/12/24  11:54 EDT

## 2024-10-12 NOTE — NON STRESS TEST
Angelica Pinon, a  at 33w6d with an TOMAS of 2024, by Ultrasound, was seen at [address] for a nonstress test.    Chief Complaint   Patient presents with    Leaking Fluid     RILEY - Patient felt a gush of fluid around noon. +FM, -VB. -CTX       Patient Active Problem List   Diagnosis    Asthma    History of  delivery affecting pregnancy    Didelphic uterus    History of intrauterine growth restriction in prior pregnancy, currently pregnant in third trimester     labor in third trimester without delivery    Pregnancy    Hx of preeclampsia, prior pregnancy, currently pregnant       Start Time:   Stop Time:     Interpretation A  Nonstress Test Interpretation A: Reactive

## 2024-10-12 NOTE — PLAN OF CARE
Goal Outcome Evaluation:              Outcome Evaluation: VSS, RNST, reports +FM and completed FKC for this shift. Pt denies VB, LOF, contractions, cramping, tightening or pain. No complaints identified over night. Pt hopeful for D/C home today.                             Problem: Adult Inpatient Plan of Care  Goal: Plan of Care Review  Recent Flowsheet Documentation  Taken 10/12/2024 0642 by Elicia Love, RN  Outcome Evaluation: VSS, RNST, reports +FM and completed FKC for this shift. Pt denies VB, LOF, contractions, cramping, tightening or pain. No complaints identified over night. Pt hopeful for D/C home today.  10/11/2024 2205 by Elicia Love, RN  Outcome: Resolved for upgrade, new template will be applied  Goal: Patient-Specific Goal (Individualized)  Outcome: Resolved for upgrade, new template will be applied  Goal: Absence of Hospital-Acquired Illness or Injury  Outcome: Resolved for upgrade, new template will be applied  Goal: Optimal Comfort and Wellbeing  Outcome: Resolved for upgrade, new template will be applied  Goal: Readiness for Transition of Care  Outcome: Resolved for upgrade, new template will be applied     Problem:  Labor  Goal: Delayed  Delivery  Outcome: Resolved for upgrade, new template will be applied     Problem: Maternal-Fetal Wellbeing  Goal: Optimal Maternal-Fetal Wellbeing  Outcome: Resolved for upgrade, new template will be applied     Problem:  Fall Injury Risk  Goal: Absence of Fall, Infant Drop and Related Injury  Outcome: Resolved for upgrade, new template will be applied

## 2024-10-12 NOTE — NON STRESS TEST
Angelica Pinon, a  at 34w0d with an TOMAS of 2024, by Ultrasound, was seen at [address] for a nonstress test.    Chief Complaint   Patient presents with    Leaking Fluid     RILEY - Patient felt a gush of fluid around noon. +FM, -VB. -CTX       Patient Active Problem List   Diagnosis    Asthma    History of  delivery affecting pregnancy    Didelphic uterus    History of intrauterine growth restriction in prior pregnancy, currently pregnant in third trimester     labor in third trimester without delivery    Pregnancy    Hx of preeclampsia, prior pregnancy, currently pregnant       Start Time: 952  Stop Time:     Interpretation A  Nonstress Test Interpretation A: Reactive

## 2024-10-12 NOTE — PLAN OF CARE
Goal Outcome Evaluation:  Plan of Care Reviewed With: patient        Progress: improving  Outcome Evaluation: rnst, vss, no change in sve, kick counts wnl, no complaints of ctx, lof, or vag bldg. will dc home today

## 2024-10-14 ENCOUNTER — HOSPITAL ENCOUNTER (INPATIENT)
Facility: HOSPITAL | Age: 34
LOS: 4 days | Discharge: HOME | End: 2024-10-18
Attending: STUDENT IN AN ORGANIZED HEALTH CARE EDUCATION/TRAINING PROGRAM | Admitting: STUDENT IN AN ORGANIZED HEALTH CARE EDUCATION/TRAINING PROGRAM
Payer: COMMERCIAL

## 2024-10-14 ENCOUNTER — ANESTHESIA (OUTPATIENT)
Dept: LABOR AND DELIVERY | Facility: HOSPITAL | Age: 34
End: 2024-10-14
Payer: COMMERCIAL

## 2024-10-14 ENCOUNTER — ANESTHESIA EVENT (OUTPATIENT)
Dept: LABOR AND DELIVERY | Facility: HOSPITAL | Age: 34
End: 2024-10-14
Payer: COMMERCIAL

## 2024-10-14 DIAGNOSIS — O34.219 HISTORY OF CESAREAN DELIVERY AFFECTING PREGNANCY: ICD-10-CM

## 2024-10-14 LAB
A1 MICROGLOB PLACENTAL VAG QL: POSITIVE
ABO GROUP BLD: NORMAL
ATMOSPHERIC PRESS: 752.2 MMHG
ATMOSPHERIC PRESS: 752.7 MMHG
BASE EXCESS BLDCOA CALC-SCNC: -8.6 MMOL/L (ref -2–2)
BASE EXCESS BLDCOV CALC-SCNC: -7.6 MMOL/L (ref -30–30)
BDY SITE: ABNORMAL
BDY SITE: ABNORMAL
BLD GP AB SCN SERPL QL: NEGATIVE
CO2 BLDA-SCNC: 25.5 MMOL/L (ref 23–27)
CO2 BLDA-SCNC: 25.6 MMOL/L (ref 23–27)
DEPRECATED RDW RBC AUTO: 40.5 FL (ref 37–54)
ERYTHROCYTE [DISTWIDTH] IN BLOOD BY AUTOMATED COUNT: 13.1 % (ref 12.3–15.4)
HCO3 BLDCOA-SCNC: 23 MMOL/L (ref 22–28)
HCO3 BLDCOV-SCNC: 23.3 MMOL/L
HCT VFR BLD AUTO: 36.9 % (ref 34–46.6)
HGB BLD-MCNC: 11.7 G/DL (ref 12–15.9)
MCH RBC QN AUTO: 27.3 PG (ref 26.6–33)
MCHC RBC AUTO-ENTMCNC: 31.7 G/DL (ref 31.5–35.7)
MCV RBC AUTO: 86 FL (ref 79–97)
MODALITY: ABNORMAL
MODALITY: ABNORMAL
PCO2 BLDCOA: 78.5 MMHG (ref 43–63)
PCO2 BLDCOV: 72 MM HG (ref 35–51.3)
PH BLDCOA: 7.08 PH UNITS (ref 7.18–7.34)
PH BLDCOV: 7.12 PH UNITS (ref 7.26–7.4)
PLATELET # BLD AUTO: 225 10*3/MM3 (ref 140–450)
PMV BLD AUTO: 9.8 FL (ref 6–12)
PO2 BLDCOA: <22.1 MMHG (ref 12–26)
PO2 BLDCOV: <16.7 MM HG (ref 19–39)
RBC # BLD AUTO: 4.29 10*6/MM3 (ref 3.77–5.28)
RH BLD: POSITIVE
SAO2 % BLDCOA: 11.7 %
SAO2 % BLDCOV: ABNORMAL %
T&S EXPIRATION DATE: NORMAL
TREPONEMA PALLIDUM IGG+IGM AB [PRESENCE] IN SERUM OR PLASMA BY IMMUNOASSAY: NORMAL
WBC NRBC COR # BLD AUTO: 16.21 10*3/MM3 (ref 3.4–10.8)

## 2024-10-14 PROCEDURE — 25010000002 CEFAZOLIN PER 500 MG: Performed by: STUDENT IN AN ORGANIZED HEALTH CARE EDUCATION/TRAINING PROGRAM

## 2024-10-14 PROCEDURE — 25810000003 LACTATED RINGERS SOLUTION: Performed by: STUDENT IN AN ORGANIZED HEALTH CARE EDUCATION/TRAINING PROGRAM

## 2024-10-14 PROCEDURE — 85027 COMPLETE CBC AUTOMATED: CPT | Performed by: STUDENT IN AN ORGANIZED HEALTH CARE EDUCATION/TRAINING PROGRAM

## 2024-10-14 PROCEDURE — 59510 CESAREAN DELIVERY: CPT | Performed by: STUDENT IN AN ORGANIZED HEALTH CARE EDUCATION/TRAINING PROGRAM

## 2024-10-14 PROCEDURE — 25010000002 BUPIVACAINE PF 0.75 % SOLUTION: Performed by: ANESTHESIOLOGY

## 2024-10-14 PROCEDURE — 84112 EVAL AMNIOTIC FLUID PROTEIN: CPT | Performed by: OBSTETRICS & GYNECOLOGY

## 2024-10-14 PROCEDURE — 25010000002 AZITHROMYCIN PER 500 MG: Performed by: STUDENT IN AN ORGANIZED HEALTH CARE EDUCATION/TRAINING PROGRAM

## 2024-10-14 PROCEDURE — 86780 TREPONEMA PALLIDUM: CPT | Performed by: STUDENT IN AN ORGANIZED HEALTH CARE EDUCATION/TRAINING PROGRAM

## 2024-10-14 PROCEDURE — 86900 BLOOD TYPING SEROLOGIC ABO: CPT | Performed by: STUDENT IN AN ORGANIZED HEALTH CARE EDUCATION/TRAINING PROGRAM

## 2024-10-14 PROCEDURE — 25810000003 SODIUM CHLORIDE 0.9 % SOLUTION 250 ML FLEX CONT: Performed by: STUDENT IN AN ORGANIZED HEALTH CARE EDUCATION/TRAINING PROGRAM

## 2024-10-14 PROCEDURE — 99202 OFFICE O/P NEW SF 15 MIN: CPT | Performed by: STUDENT IN AN ORGANIZED HEALTH CARE EDUCATION/TRAINING PROGRAM

## 2024-10-14 PROCEDURE — 25010000002 KETOROLAC TROMETHAMINE PER 15 MG: Performed by: STUDENT IN AN ORGANIZED HEALTH CARE EDUCATION/TRAINING PROGRAM

## 2024-10-14 PROCEDURE — 25010000002 ONDANSETRON PER 1 MG: Performed by: STUDENT IN AN ORGANIZED HEALTH CARE EDUCATION/TRAINING PROGRAM

## 2024-10-14 PROCEDURE — 25010000002 MORPHINE PER 10 MG: Performed by: ANESTHESIOLOGY

## 2024-10-14 PROCEDURE — 25010000002 CLONIDINE PER 1 MG: Performed by: STUDENT IN AN ORGANIZED HEALTH CARE EDUCATION/TRAINING PROGRAM

## 2024-10-14 PROCEDURE — 25810000003 LACTATED RINGERS PER 1000 ML: Performed by: STUDENT IN AN ORGANIZED HEALTH CARE EDUCATION/TRAINING PROGRAM

## 2024-10-14 PROCEDURE — 25010000002 DIPHENHYDRAMINE PER 50 MG: Performed by: STUDENT IN AN ORGANIZED HEALTH CARE EDUCATION/TRAINING PROGRAM

## 2024-10-14 PROCEDURE — 88307 TISSUE EXAM BY PATHOLOGIST: CPT

## 2024-10-14 PROCEDURE — 86850 RBC ANTIBODY SCREEN: CPT | Performed by: STUDENT IN AN ORGANIZED HEALTH CARE EDUCATION/TRAINING PROGRAM

## 2024-10-14 PROCEDURE — 82803 BLOOD GASES ANY COMBINATION: CPT | Performed by: STUDENT IN AN ORGANIZED HEALTH CARE EDUCATION/TRAINING PROGRAM

## 2024-10-14 PROCEDURE — 25010000002 EPINEPHRINE 1 MG/ML SOLUTION 30 ML VIAL: Performed by: STUDENT IN AN ORGANIZED HEALTH CARE EDUCATION/TRAINING PROGRAM

## 2024-10-14 PROCEDURE — 86901 BLOOD TYPING SEROLOGIC RH(D): CPT | Performed by: STUDENT IN AN ORGANIZED HEALTH CARE EDUCATION/TRAINING PROGRAM

## 2024-10-14 PROCEDURE — 25010000002 ROPIVACAINE PER 1 MG: Performed by: STUDENT IN AN ORGANIZED HEALTH CARE EDUCATION/TRAINING PROGRAM

## 2024-10-14 DEVICE — ABSORBABLE HEMOSTAT (OXIDIZED REGENERATED CELLULOSE)
Type: IMPLANTABLE DEVICE | Site: UTERUS | Status: FUNCTIONAL
Brand: SURGICEL

## 2024-10-14 RX ORDER — IBUPROFEN 600 MG/1
600 TABLET, FILM COATED ORAL EVERY 6 HOURS
Status: DISCONTINUED | OUTPATIENT
Start: 2024-10-15 | End: 2024-10-18 | Stop reason: HOSPADM

## 2024-10-14 RX ORDER — MORPHINE SULFATE 2 MG/ML
2 INJECTION, SOLUTION INTRAMUSCULAR; INTRAVENOUS
Status: DISCONTINUED | OUTPATIENT
Start: 2024-10-14 | End: 2024-10-14 | Stop reason: HOSPADM

## 2024-10-14 RX ORDER — OXYCODONE HYDROCHLORIDE 10 MG/1
10 TABLET ORAL EVERY 4 HOURS PRN
Status: DISCONTINUED | OUTPATIENT
Start: 2024-10-14 | End: 2024-10-18 | Stop reason: HOSPADM

## 2024-10-14 RX ORDER — ONDANSETRON 2 MG/ML
4 INJECTION INTRAMUSCULAR; INTRAVENOUS EVERY 6 HOURS PRN
Status: DISCONTINUED | OUTPATIENT
Start: 2024-10-14 | End: 2024-10-14

## 2024-10-14 RX ORDER — ACETAMINOPHEN 500 MG
1000 TABLET ORAL EVERY 6 HOURS
Status: COMPLETED | OUTPATIENT
Start: 2024-10-15 | End: 2024-10-15

## 2024-10-14 RX ORDER — OXYTOCIN/0.9 % SODIUM CHLORIDE 30/500 ML
999 PLASTIC BAG, INJECTION (ML) INTRAVENOUS ONCE
Status: COMPLETED | OUTPATIENT
Start: 2024-10-14 | End: 2024-10-14

## 2024-10-14 RX ORDER — KETOROLAC TROMETHAMINE 30 MG/ML
30 INJECTION, SOLUTION INTRAMUSCULAR; INTRAVENOUS ONCE
Status: COMPLETED | OUTPATIENT
Start: 2024-10-14 | End: 2024-10-14

## 2024-10-14 RX ORDER — KETOROLAC TROMETHAMINE 15 MG/ML
15 INJECTION, SOLUTION INTRAMUSCULAR; INTRAVENOUS EVERY 6 HOURS
Status: DISPENSED | OUTPATIENT
Start: 2024-10-14 | End: 2024-10-15

## 2024-10-14 RX ORDER — CITRIC ACID/SODIUM CITRATE 334-500MG
30 SOLUTION, ORAL ORAL ONCE
Status: CANCELLED | OUTPATIENT
Start: 2024-10-14 | End: 2024-10-14

## 2024-10-14 RX ORDER — HYDROXYZINE HYDROCHLORIDE 50 MG/1
50 TABLET, FILM COATED ORAL EVERY 6 HOURS PRN
Status: DISCONTINUED | OUTPATIENT
Start: 2024-10-14 | End: 2024-10-18 | Stop reason: HOSPADM

## 2024-10-14 RX ORDER — MORPHINE SULFATE 2 MG/ML
2 INJECTION, SOLUTION INTRAMUSCULAR; INTRAVENOUS
Status: ACTIVE | OUTPATIENT
Start: 2024-10-14 | End: 2024-10-15

## 2024-10-14 RX ORDER — NALOXONE HCL 0.4 MG/ML
0.2 VIAL (ML) INJECTION
Status: CANCELLED | OUTPATIENT
Start: 2024-10-14

## 2024-10-14 RX ORDER — ONDANSETRON 4 MG/1
4 TABLET, ORALLY DISINTEGRATING ORAL EVERY 6 HOURS PRN
Status: DISCONTINUED | OUTPATIENT
Start: 2024-10-14 | End: 2024-10-14 | Stop reason: HOSPADM

## 2024-10-14 RX ORDER — AMOXICILLIN 250 MG
1 CAPSULE ORAL 2 TIMES DAILY
Status: DISCONTINUED | OUTPATIENT
Start: 2024-10-14 | End: 2024-10-18 | Stop reason: HOSPADM

## 2024-10-14 RX ORDER — MORPHINE SULFATE 1 MG/ML
INJECTION, SOLUTION EPIDURAL; INTRATHECAL; INTRAVENOUS AS NEEDED
Status: DISCONTINUED | OUTPATIENT
Start: 2024-10-14 | End: 2024-10-14 | Stop reason: SURG

## 2024-10-14 RX ORDER — LIDOCAINE HYDROCHLORIDE 10 MG/ML
0.5 INJECTION, SOLUTION INFILTRATION; PERINEURAL ONCE AS NEEDED
Status: DISCONTINUED | OUTPATIENT
Start: 2024-10-14 | End: 2024-10-14 | Stop reason: HOSPADM

## 2024-10-14 RX ORDER — EPHEDRINE SULFATE 50 MG/ML
INJECTION INTRAVENOUS AS NEEDED
Status: DISCONTINUED | OUTPATIENT
Start: 2024-10-14 | End: 2024-10-14 | Stop reason: SURG

## 2024-10-14 RX ORDER — SODIUM CHLORIDE, SODIUM LACTATE, POTASSIUM CHLORIDE, CALCIUM CHLORIDE 600; 310; 30; 20 MG/100ML; MG/100ML; MG/100ML; MG/100ML
125 INJECTION, SOLUTION INTRAVENOUS CONTINUOUS
Status: DISCONTINUED | OUTPATIENT
Start: 2024-10-14 | End: 2024-10-14

## 2024-10-14 RX ORDER — ONDANSETRON 2 MG/ML
4 INJECTION INTRAMUSCULAR; INTRAVENOUS ONCE AS NEEDED
Status: DISCONTINUED | OUTPATIENT
Start: 2024-10-14 | End: 2024-10-18 | Stop reason: HOSPADM

## 2024-10-14 RX ORDER — DIPHENHYDRAMINE HYDROCHLORIDE 50 MG/ML
25 INJECTION INTRAMUSCULAR; INTRAVENOUS EVERY 4 HOURS PRN
Status: DISCONTINUED | OUTPATIENT
Start: 2024-10-14 | End: 2024-10-18 | Stop reason: HOSPADM

## 2024-10-14 RX ORDER — SODIUM CHLORIDE 0.9 % (FLUSH) 0.9 %
10 SYRINGE (ML) INJECTION EVERY 12 HOURS SCHEDULED
Status: DISCONTINUED | OUTPATIENT
Start: 2024-10-14 | End: 2024-10-14 | Stop reason: HOSPADM

## 2024-10-14 RX ORDER — FAMOTIDINE 10 MG/ML
20 INJECTION, SOLUTION INTRAVENOUS 2 TIMES DAILY
Status: DISCONTINUED | OUTPATIENT
Start: 2024-10-14 | End: 2024-10-14

## 2024-10-14 RX ORDER — PHENYLEPHRINE HCL IN 0.9% NACL 1 MG/10 ML
SYRINGE (ML) INTRAVENOUS AS NEEDED
Status: DISCONTINUED | OUTPATIENT
Start: 2024-10-14 | End: 2024-10-14 | Stop reason: SURG

## 2024-10-14 RX ORDER — SODIUM CHLORIDE 9 MG/ML
40 INJECTION, SOLUTION INTRAVENOUS AS NEEDED
Status: DISCONTINUED | OUTPATIENT
Start: 2024-10-14 | End: 2024-10-14 | Stop reason: HOSPADM

## 2024-10-14 RX ORDER — CITRIC ACID/SODIUM CITRATE 334-500MG
30 SOLUTION, ORAL ORAL ONCE
Status: COMPLETED | OUTPATIENT
Start: 2024-10-14 | End: 2024-10-14

## 2024-10-14 RX ORDER — DIPHENHYDRAMINE HCL 25 MG
25 CAPSULE ORAL EVERY 4 HOURS PRN
Status: DISCONTINUED | OUTPATIENT
Start: 2024-10-14 | End: 2024-10-18 | Stop reason: HOSPADM

## 2024-10-14 RX ORDER — OXYCODONE HYDROCHLORIDE 5 MG/1
5 TABLET ORAL EVERY 4 HOURS PRN
Status: DISCONTINUED | OUTPATIENT
Start: 2024-10-14 | End: 2024-10-18 | Stop reason: HOSPADM

## 2024-10-14 RX ORDER — ACETAMINOPHEN 500 MG
1000 TABLET ORAL ONCE
Status: COMPLETED | OUTPATIENT
Start: 2024-10-14 | End: 2024-10-14

## 2024-10-14 RX ORDER — MISOPROSTOL 200 UG/1
800 TABLET ORAL AS NEEDED
Status: DISCONTINUED | OUTPATIENT
Start: 2024-10-14 | End: 2024-10-14 | Stop reason: HOSPADM

## 2024-10-14 RX ORDER — ONDANSETRON 2 MG/ML
4 INJECTION INTRAMUSCULAR; INTRAVENOUS ONCE AS NEEDED
Status: CANCELLED | OUTPATIENT
Start: 2024-10-14

## 2024-10-14 RX ORDER — BUPIVACAINE HYDROCHLORIDE 7.5 MG/ML
INJECTION, SOLUTION EPIDURAL; RETROBULBAR AS NEEDED
Status: DISCONTINUED | OUTPATIENT
Start: 2024-10-14 | End: 2024-10-14 | Stop reason: SURG

## 2024-10-14 RX ORDER — METOCLOPRAMIDE HYDROCHLORIDE 5 MG/ML
10 INJECTION INTRAMUSCULAR; INTRAVENOUS EVERY 6 HOURS PRN
Status: DISCONTINUED | OUTPATIENT
Start: 2024-10-14 | End: 2024-10-14 | Stop reason: HOSPADM

## 2024-10-14 RX ORDER — OXYTOCIN/0.9 % SODIUM CHLORIDE 30/500 ML
125 PLASTIC BAG, INJECTION (ML) INTRAVENOUS ONCE AS NEEDED
Status: COMPLETED | OUTPATIENT
Start: 2024-10-14 | End: 2024-10-14

## 2024-10-14 RX ORDER — SODIUM CHLORIDE 0.9 % (FLUSH) 0.9 %
10 SYRINGE (ML) INJECTION AS NEEDED
Status: DISCONTINUED | OUTPATIENT
Start: 2024-10-14 | End: 2024-10-14 | Stop reason: HOSPADM

## 2024-10-14 RX ORDER — CARBOPROST TROMETHAMINE 250 UG/ML
250 INJECTION, SOLUTION INTRAMUSCULAR AS NEEDED
Status: DISCONTINUED | OUTPATIENT
Start: 2024-10-14 | End: 2024-10-14 | Stop reason: HOSPADM

## 2024-10-14 RX ORDER — DIPHENHYDRAMINE HYDROCHLORIDE 50 MG/ML
25 INJECTION INTRAMUSCULAR; INTRAVENOUS EVERY 6 HOURS PRN
Status: DISCONTINUED | OUTPATIENT
Start: 2024-10-14 | End: 2024-10-18 | Stop reason: HOSPADM

## 2024-10-14 RX ORDER — ONDANSETRON 2 MG/ML
4 INJECTION INTRAMUSCULAR; INTRAVENOUS EVERY 6 HOURS PRN
Status: DISCONTINUED | OUTPATIENT
Start: 2024-10-14 | End: 2024-10-14 | Stop reason: HOSPADM

## 2024-10-14 RX ORDER — OXYTOCIN/0.9 % SODIUM CHLORIDE 30/500 ML
250 PLASTIC BAG, INJECTION (ML) INTRAVENOUS CONTINUOUS
Status: DISPENSED | OUTPATIENT
Start: 2024-10-14 | End: 2024-10-14

## 2024-10-14 RX ORDER — FAMOTIDINE 10 MG/ML
20 INJECTION, SOLUTION INTRAVENOUS ONCE AS NEEDED
Status: CANCELLED | OUTPATIENT
Start: 2024-10-14

## 2024-10-14 RX ORDER — ACETAMINOPHEN 325 MG/1
650 TABLET ORAL EVERY 6 HOURS
Status: DISCONTINUED | OUTPATIENT
Start: 2024-10-16 | End: 2024-10-18 | Stop reason: HOSPADM

## 2024-10-14 RX ORDER — ONDANSETRON 4 MG/1
4 TABLET, ORALLY DISINTEGRATING ORAL EVERY 4 HOURS PRN
Status: DISCONTINUED | OUTPATIENT
Start: 2024-10-14 | End: 2024-10-18 | Stop reason: HOSPADM

## 2024-10-14 RX ORDER — HYDROMORPHONE HYDROCHLORIDE 1 MG/ML
0.5 INJECTION, SOLUTION INTRAMUSCULAR; INTRAVENOUS; SUBCUTANEOUS
Status: DISCONTINUED | OUTPATIENT
Start: 2024-10-14 | End: 2024-10-14 | Stop reason: HOSPADM

## 2024-10-14 RX ORDER — METHYLERGONOVINE MALEATE 0.2 MG/ML
200 INJECTION INTRAVENOUS ONCE AS NEEDED
Status: DISCONTINUED | OUTPATIENT
Start: 2024-10-14 | End: 2024-10-14 | Stop reason: HOSPADM

## 2024-10-14 RX ADMIN — SODIUM CHLORIDE 2 G: 900 INJECTION INTRAVENOUS at 17:32

## 2024-10-14 RX ADMIN — AZITHROMYCIN MONOHYDRATE 500 MG: 500 INJECTION, POWDER, LYOPHILIZED, FOR SOLUTION INTRAVENOUS at 17:47

## 2024-10-14 RX ADMIN — KETOROLAC TROMETHAMINE 30 MG: 30 INJECTION, SOLUTION INTRAMUSCULAR at 19:30

## 2024-10-14 RX ADMIN — MORPHINE SULFATE 0.4 MG: 1 INJECTION, SOLUTION EPIDURAL; INTRATHECAL; INTRAVENOUS at 17:42

## 2024-10-14 RX ADMIN — ACETAMINOPHEN 1000 MG: 500 TABLET ORAL at 17:13

## 2024-10-14 RX ADMIN — SODIUM CHLORIDE, POTASSIUM CHLORIDE, SODIUM LACTATE AND CALCIUM CHLORIDE: 600; 310; 30; 20 INJECTION, SOLUTION INTRAVENOUS at 17:30

## 2024-10-14 RX ADMIN — SODIUM CITRATE AND CITRIC ACID MONOHYDRATE 30 ML: 334; 500 SOLUTION ORAL at 17:15

## 2024-10-14 RX ADMIN — Medication 100 MCG: at 18:15

## 2024-10-14 RX ADMIN — SODIUM CHLORIDE, POTASSIUM CHLORIDE, SODIUM LACTATE AND CALCIUM CHLORIDE 1000 ML: 600; 310; 30; 20 INJECTION, SOLUTION INTRAVENOUS at 17:03

## 2024-10-14 RX ADMIN — Medication 125 ML/HR: at 19:30

## 2024-10-14 RX ADMIN — EPHEDRINE SULFATE 5 MG: 50 INJECTION INTRAVENOUS at 17:55

## 2024-10-14 RX ADMIN — Medication 100 MCG: at 18:23

## 2024-10-14 RX ADMIN — Medication 999 ML/HR: at 18:00

## 2024-10-14 RX ADMIN — DIPHENHYDRAMINE HYDROCHLORIDE 25 MG: 50 INJECTION, SOLUTION INTRAMUSCULAR; INTRAVENOUS at 19:42

## 2024-10-14 RX ADMIN — ONDANSETRON 4 MG: 2 INJECTION INTRAMUSCULAR; INTRAVENOUS at 17:15

## 2024-10-14 RX ADMIN — Medication 100 MCG: at 18:11

## 2024-10-14 RX ADMIN — CLONIDINE HYDROCHLORIDE 100 ML: 0.1 INJECTION, SOLUTION EPIDURAL at 18:30

## 2024-10-14 RX ADMIN — Medication 100 MCG: at 18:17

## 2024-10-14 RX ADMIN — FAMOTIDINE 20 MG: 10 INJECTION INTRAVENOUS at 17:14

## 2024-10-14 RX ADMIN — BUPIVACAINE HYDROCHLORIDE 1.6 ML: 7.5 INJECTION, SOLUTION EPIDURAL; RETROBULBAR at 17:42

## 2024-10-14 RX ADMIN — ONDANSETRON 4 MG: 2 INJECTION INTRAMUSCULAR; INTRAVENOUS at 17:58

## 2024-10-14 NOTE — L&D DELIVERY NOTE
[facility]   Obstetrics and Gynecology     10/14/2024    Patient:Angelica Pinon   MR#:[id number]     Section Procedure Note    Indications:  labor with a history of one prior , declines TOLAC    Pre-operative Diagnosis: Intrauterine pregnancy at 34w2d    Post-operative Diagnosis: same    Procedure:  Low transverse  section     Surgeon: Andreia Almonte MD     Assistants: Oscar Tsang    Anesthesia: Spinal anesthesia    Prenatal care problem list:  Patient Active Problem List   Diagnosis    Asthma    History of  delivery affecting pregnancy    Didelphic uterus    History of intrauterine growth restriction in prior pregnancy, currently pregnant in third trimester     labor in third trimester without delivery    Pregnancy    Hx of preeclampsia, prior pregnancy, currently pregnant     delivery delivered       Procedure Details   The patient was seen in the LDR preoperatively. The risks, benefits, complications, treatment options, and expected outcomes were discussed with the patient.  The patient concurred with the proposed plan, giving informed consent.  The site of surgery is discussed. The patient was taken to Operating Room # 2, identified as Angelica Pinon and the procedure verified as  Delivery. A Time Out was held and the above information confirmed.    After induction of anesthesia, the patient was draped and prepped in the usual sterile manner. A Pfannenstiel incision was made and carried down through the subcutaneous tissue to the fascia. Fascial incision was made and extended transversely. The fascia was  from the underlying rectus tissue superiorly and inferiorly. The peritoneum was identified and entered. Peritoneal incision was extended longitudinally.  There were extensive bowel and bladder adhesions to the uterus that were gently taken down with a combination of blunt and sharp dissection.  A bladder flap was  made.  A low transverse uterine incision was made.  Delivered from vertex presentation was a male fetus 2540 g (5 lb 9.6 oz) with Apgar scores of 7 at one minute and 9 at five minutes. Umbilical cord was clamped and cut after a 30-second delay.  Cord blood was obtained for evaluation. The placenta was removed intact and appeared normal. The uterine outline, tubes and ovaries appeared normal.  The uterine incision was closed with running locked sutures of 0 monocryl. A second imbricating layer of the same suture was placed.  There was continued oozing from deep between the lower uterine segment and bladder.  Surgicel was placed.  Excellent hemostasis was observed.  The posterior cul-de-sac was cleared of all blood.  The uterus was returned to the abdomen.  The paracolic gutters were cleared of all blood.  The uterus was reexamined and excellent hemostasis was confirmed.    The fascia was then reapproximated with running sutures of 0 Vicryl.  OB anesthetic cocktail was injected into subcutaneous layer.  The deep subcutaneous layer was reapproximated with 3-0 monocryl in a running fashion. The skin was reapproximated with 4-0 monocryl in a subcuticular fashion.     Instrument, sponge, and needle counts were correct prior to abdominal closure and at the conclusion of the case.     Surgical assistant was responsible for performing the following activities: Retraction, Suction, Irrigation, Closing, Placing Dressing and Delivery of Fetus and their skilled assistance was necessary for the success of this case.    Findings:  YOB: 2022  Time of birth: 5:58 PM  Live, viable male infant  Baby weight: 2540 g (5 lb 9.6 oz)            APGARS  One minute Five minutes   Skin color: 0   1     Heart rate: 2   2     Grimace: 2   2     Muscle tone: 1   2     Breathin   2     Totals: 7   9       -Extensive adhesions between the bladder, bowel, and uterus  -Didelphic uterus - right uterus gravid, left uterus appeared  nongravid and normal  -Each uterus with one ovary and fallopian tube that appeared normal    Estimated Blood Loss:   500cc    Calculated Blood Loss:              Specimens:  Placenta            Complications:  None; patient tolerated the procedure well.           Disposition: PACU - hemodynamically stable.           Condition: stable    Cord gases:    pH, Cord Venous   Date Value Ref Range Status   10/14/2024 7.119 (L) 7.260 - 7.400 pH Units Final     Comment:     Serial Number: [id number]:  [id number]     Base Excess, Cord Venous   Date Value Ref Range Status   10/14/2024 -7.6 -30.0 - 30.0 mmol/L Final       Andreia Almonte MD  10/14/2024   19:02 EDT

## 2024-10-14 NOTE — ANESTHESIA PROCEDURE NOTES
Intrathecal Block    Pre-sedation assessment completed: 10/14/2024 5:40 PM    Patient reassessed immediately prior to procedure    Start Time: 10/14/2024 5:40 PM  Stop Time: 10/14/2024 5:44 PM  Indication:at surgeon's request  Performed By  Anesthesiologist: Lita Linn MD  Preanesthetic Checklist  Completed: patient identified, site marked, surgical consent, pre-op evaluation, timeout performed, IV checked, risks and benefits discussed and monitors and equipment checked  Intrathecal Block Prep:  Pt Position:sitting  Sterile Tech:sterile barrier, gloves and cap  Prep:chloraprep  Monitoring:blood pressure monitoring, continuous pulse oximetry and EKG  Intrathecal Block Procedure:  Approach:midline  Guidance:palpation technique  Location:L4-L5  Needle Type:Kyle  Needle Gauge:25 G  Placement of Needle Event: cerebrospinal fluid  Fluid Appearance:clear  Post Assessment:  Patient Tolerance:patient tolerated the procedure well with no apparent complications  Complications:no

## 2024-10-14 NOTE — ANESTHESIA PREPROCEDURE EVALUATION
Anesthesia Evaluation     Patient summary reviewed   NPO Solid Status: > 4 hours             Airway   Mallampati: I  TM distance: >3 FB  Neck ROM: full  Dental      Pulmonary    (+) asthma,  Cardiovascular         Neuro/Psych  GI/Hepatic/Renal/Endo      Musculoskeletal     Abdominal    Substance History      OB/GYN    (+) Pregnant        Other        ROS/Med Hx Other: Prev C section, in active labor.              Anesthesia Plan    ASA 2 - emergent     spinal       Anesthetic plan, risks, benefits, and alternatives have been provided, discussed and informed consent has been obtained with: patient.    CODE STATUS:    Level Of Support Discussed With: Patient  Code Status (Patient has no pulse and is not breathing): CPR (Attempt to Resuscitate)  Medical Interventions (Patient has pulse or is breathing): Full Support

## 2024-10-14 NOTE — H&P
[facility]   Obstetrics and Gynecology   History & Physical    10/14/2024    Patient: Angelica Pinon          MR#:[id number]    Chief complaint:  labor, SROM    Subjective     34 y.o. female  at 34w2d presents with regular, painful contractions and a gush of clear fluid approximately 30 minutes ago.  She was discharged 2 days ago from an antepartum admission for  labor.  Cervix progressed to 2 cm at that time.  Today cervix is 5/90/0 with leaking fluid.  ROM+ is pending.  She has a history of 1 prior  and desires repeat.  She received a course of betamethasone 10/9-10.      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  Fetal growth has been normal with an EFW of 2396 g (55%, AC 67%) on 10/10/2024.      Patient Active Problem List   Diagnosis    Asthma    History of  delivery affecting pregnancy    Didelphic uterus    History of intrauterine growth restriction in prior pregnancy, currently pregnant in third trimester     labor in third trimester without delivery    Pregnancy    Hx of preeclampsia, prior pregnancy, currently pregnant       Past Medical History:   Diagnosis Date    Anxiety     Asthma     Depression Last year    I took medicine and changed eating/exercise habits and am off meds and feeling better now    Didelphic uterus     Headache     Intrauterine growth restriction (IUGR) affecting care of mother     Migraine     Overweight (BMI 25.0-29.9) 10/28/2023    Pregnancy 10/9/2024    Urinary tract infection     Varicella        Past Surgical History:   Procedure Laterality Date     SECTION      WISDOM TOOTH EXTRACTION         Obstetric History:  OB History          2    Para   1    Term   1       0    AB   0    Living   1         SAB   0    IAB   0    Ectopic   0    Molar   0    Multiple   0    Live Births   1               Menstrual History:     Patient's last menstrual period was 02/10/2024 (approximate).       # 1 -  Date: 21, Sex: Male, Weight: 2268 g (5 lb), GA: 37w0d, Type: , Unspecified, Apgar1: None, Apgar5: None, Living: Living, Birth Comments: None    # 2 - Date: None, Sex: None, Weight: None, GA: None, Type: None, Apgar1: None, Apgar5: None, Living: None, Birth Comments: None      Prenatal Information:  Prenatal Results       Initial Prenatal Labs       Test Value Reference Range Date Time    Hemoglobin  14.1 g/dL 11.1 - 15.9 2418    Hematocrit  43.1 % 34.0 - 46.6 24    Platelets  231 x10E3/uL 150 - 450 2418    Rubella IgG  1.86 index Immune >0.99 24    Hepatitis B SAg  Negative  Negative 24    Hepatitis C Ab        RPR  Non Reactive  Non Reactive 24 09    T. Pallidum Ab   Non-Reactive  Non-Reactive 10/09/24 1821       Non Reactive  Non Reactive 24 0921    ABO  O   10/09/24 1821    Rh  Positive   10/09/24 1821    Antibody Screen  Negative  Negative 2418    HIV  Non Reactive  Non Reactive 2418    Urine Culture  25,000 CFU/mL Normal Urogenital Nicole   10/09/24 1408       Final report   24 0927       Final report   24 1318    Gonorrhea  Negative  Negative 24 1311    Chlamydia  Negative  Negative 24 1311    TSH        HgB A1c         Varicella IgG  1,111 index Immune >165 2418    Hemoglobinopathy Fractionation  Comment   24 0918    Hemoglobinopathy (genetic testing)        Cystic fibrosis                   Fetal testing        Test Value Reference Range Date Time    NIPT        MSAFP        AFP-4                  2nd and 3rd Trimester       Test Value Reference Range Date Time    Hemoglobin (repeated)  12.1 g/dL 12.0 - 15.9 10/09/24 182       12.1 g/dL 12.0 - 15.9 24 09    Hematocrit (repeated)  38.5 % 34.0 - 46.6 10/09/24 1821       37.9 % 34.0 - 46.6 2421    Platelets   232 10*3/mm3 140 - 450 10/09/24 1821       218 10*3/mm3 140 - 450 24 0921       231 x10E3/uL 150  - 450 04/05/24 0918    1 hour GTT   128 mg/dL 65 - 139 08/23/24 0921    Antibody Screen (repeated)  Negative   10/09/24 1821    3rd TM syphilis scrn (repeated)  RPR         3rd TM syphilis scrn (repeated) TP-Ab  Non-Reactive  Non-Reactive 10/09/24 1821       Non Reactive  Non Reactive 08/23/24 0921    3rd TM syphilis screen TB-Ab (FTA)  Non-Reactive  Non-Reactive 10/09/24 1821       Non Reactive  Non Reactive 08/23/24 0921    Syphilis cascade test TP-Ab (EIA)        Syphilis cascade TPPA        GTT Fasting        GTT 1 Hr        GTT 2 Hr        GTT 3 Hr        Group B Strep  No Group B Streptococcus isolated   10/09/24 1740              Other testing        Test Value Reference Range Date Time    Parvo IgG         CMV IgG                   Drug Screening       Test Value Reference Range Date Time    Amphetamine Screen        Barbiturate Screen        Benzodiazepine Screen        Methadone Screen        Phencyclidine Screen        Opiates Screen        THC Screen        Cocaine Screen        Propoxyphene Screen        Buprenorphine Screen        Methamphetamine Screen        Oxycodone Screen        Tricyclic Antidepressants Screen                  Legend    ^: Historical                          External Prenatal Results       Pregnancy Outside Results - Transcribed From Office Records - See Scanned Records For Details       Test Value Date Time    ABO  O  10/09/24 1821    Rh  Positive  10/09/24 1821    Antibody Screen  Negative  10/09/24 1821       Negative  04/05/24 0918    Varicella IgG  1,111 index 04/05/24 0918    Rubella  1.86 index 04/05/24 0918    Hgb  12.1 g/dL 10/09/24 1821       12.1 g/dL 08/23/24 0921       14.1 g/dL 04/05/24 0918    Hct  38.5 % 10/09/24 1821       37.9 % 08/23/24 0921       43.1 % 04/05/24 0918    HgB A1c        1h GTT  128 mg/dL 08/23/24 0921    3h GTT Fasting       3h GTT 1 hour       3h GTT 2 hour       3h GTT 3 hour        Gonorrhea (discrete)  Negative  04/05/24 1311    Chlamydia  (discrete)  Negative  04/05/24 1311    RPR  Non Reactive  04/05/24 0918    Syphils cascade: TP-Ab (FTA)  Non-Reactive  10/09/24 1821       Non Reactive  08/23/24 0921    TP-Ab  Non-Reactive  10/09/24 1821       Non Reactive  08/23/24 0921    TP-Ab (EIA)       TPPA       HBsAg  Negative  04/05/24 0918    Herpes Simplex Virus PCR       Herpes Simplex VIrus Culture       HIV  Non Reactive  04/05/24 0918    Hep C RNA Quant PCR       Hep C Antibody       AFP       NIPT       Cystic Fibroisis        Group B Strep  No Group B Streptococcus isolated  10/09/24 1740    GBS Susceptibility to Clindamycin       GBS Susceptibility to Erythromycin       Fetal Fibronectin  Positive  10/09/24 1408    Genetic Testing, Maternal Blood                 Drug Screening       Test Value Date Time    Urine Drug Screen       Amphetamine Screen       Barbiturate Screen       Benzodiazepine Screen       Methadone Screen       Phencyclidine Screen       Opiates Screen       THC Screen       Cocaine Screen       Propoxyphene Screen       Buprenorphine Screen       Methamphetamine Screen       Oxycodone Screen       Tricyclic Antidepressants Screen                 Legend    ^: Historical                              Family History   Problem Relation Age of Onset    No Known Problems Mother     Diabetes Father     Ulcerative colitis Sister     Seizures Sister     No Known Problems Brother     No Known Problems Maternal Grandmother     Cancer Maternal Grandfather     Breast cancer Neg Hx     Ovarian cancer Neg Hx     Uterine cancer Neg Hx     Colon cancer Neg Hx        Social History     Tobacco Use    Smoking status: Never    Smokeless tobacco: Never   Vaping Use    Vaping status: Never Used   Substance Use Topics    Alcohol use: No    Drug use: Never       Patient has no known allergies.      Current Facility-Administered Medications:     acetaminophen (TYLENOL) tablet 1,000 mg, 1,000 mg, Oral, Once, Diana Rivera MD    carboprost  (HEMABATE) injection 250 mcg, 250 mcg, Intramuscular, PRN, Diana Rivera MD    ceFAZolin 2000 mg IVPB in 100 mL NS (MBP), 2 g, Intravenous, Once, Diana Rivera MD    incisional cocktail 6 - Ropivacaine 0.5% (50mL), Clonidine HCl 80mcg/0.8 mL,  Epinephrine 1:1000 (0.3mL), N/S 0.9% (50mL) solution 100 mL, 100 mL, Injection, Once, Andreia Almonte MD    lactated ringers bolus 1,000 mL, 1,000 mL, Intravenous, Once, Diana Rivera MD    lactated ringers infusion, 125 mL/hr, Intravenous, Continuous, Diana Rivera MD    lidocaine (XYLOCAINE) 1 % injection 0.5 mL, 0.5 mL, Intradermal, Once PRN, Diana Rivera MD    methylergonovine (METHERGINE) injection 200 mcg, 200 mcg, Intramuscular, Once PRN, Diana Rivera MD    miSOPROStol (CYTOTEC) tablet 800 mcg, 800 mcg, Rectal, PRN, Diana Rivera MD    oxytocin (PITOCIN) 30 units in 0.9% sodium chloride 500 mL (premix), 999 mL/hr, Intravenous, Once **FOLLOWED BY** oxytocin (PITOCIN) 30 units in 0.9% sodium chloride 500 mL (premix), 250 mL/hr, Intravenous, Continuous, Diana Rivera MD    sodium chloride 0.9 % flush 10 mL, 10 mL, Intravenous, Q12H, Diana Rivera MD    sodium chloride 0.9 % flush 10 mL, 10 mL, Intravenous, PRN, Diana Rivera MD    sodium chloride 0.9 % infusion 40 mL, 40 mL, Intravenous, PRN, Diana Rivera MD    Review of Systems  Review of Systems   All other systems reviewed and are negative.      Objective     Vital Signs  Temp:  [98.3 °F (36.8 °C)] 98.3 °F (36.8 °C)  Heart Rate:  [78] 78  Resp:  [16] 16  BP: (147)/(81) 147/81    Physical Exam:  Physical Exam  Vitals and nursing note reviewed.   Constitutional:       General: She is not in acute distress.     Appearance: Normal appearance.   HENT:      Head: Normocephalic and atraumatic.   Eyes:      Extraocular Movements: Extraocular movements intact.   Cardiovascular:      Rate and Rhythm: Normal rate.    Pulmonary:      Effort: Pulmonary effort is normal. No respiratory distress.   Abdominal:      General: There is no distension.      Palpations: Abdomen is soft. There is no mass.      Tenderness: There is no abdominal tenderness.   Musculoskeletal:         General: Normal range of motion.      Cervical back: Normal range of motion.   Skin:     General: Skin is warm and dry.   Neurological:      General: No focal deficit present.      Mental Status: She is alert and oriented to person, place, and time.   Psychiatric:         Mood and Affect: Mood normal.         Behavior: Behavior normal.           Hospital problem list:    History of  delivery affecting pregnancy    Asthma    Didelphic uterus    History of intrauterine growth restriction in prior pregnancy, currently pregnant in third trimester     labor in third trimester without delivery    Pregnancy    Hx of preeclampsia, prior pregnancy, currently pregnant      Assessment & Plan     1. Intrauterine pregnancy at 34w2d with history of 1 prior  presents in  labor  -Patient desires repeat  and declines trial of labor.  L&D team notified of decision for prompt repeat .  -Reviewed procedure with patient.  I discussed the risks including but not limited to bleeding, infection and damage to internal organs.  Understanding of the procedure is voiced.  -Plan for NICU present at delivery  - Status post betamethasone 10/9-10    Dispo: admit for [facility]    Andreia Almonte MD  10/14/24  16:50 EDT      Patient Care Team:  Pavel Fagan DO as PCP - General (Family Medicine)  Nell Villareal MD as Consulting Physician (Obstetrics and Gynecology)

## 2024-10-15 LAB
BASOPHILS # BLD AUTO: 0.04 10*3/MM3 (ref 0–0.2)
BASOPHILS NFR BLD AUTO: 0.2 % (ref 0–1.5)
DEPRECATED RDW RBC AUTO: 42.4 FL (ref 37–54)
EOSINOPHIL # BLD AUTO: 0.09 10*3/MM3 (ref 0–0.4)
EOSINOPHIL NFR BLD AUTO: 0.5 % (ref 0.3–6.2)
ERYTHROCYTE [DISTWIDTH] IN BLOOD BY AUTOMATED COUNT: 13.3 % (ref 12.3–15.4)
HCT VFR BLD AUTO: 31.5 % (ref 34–46.6)
HGB BLD-MCNC: 10 G/DL (ref 12–15.9)
IMM GRANULOCYTES # BLD AUTO: 0.19 10*3/MM3 (ref 0–0.05)
IMM GRANULOCYTES NFR BLD AUTO: 1.1 % (ref 0–0.5)
LYMPHOCYTES # BLD AUTO: 1.81 10*3/MM3 (ref 0.7–3.1)
LYMPHOCYTES NFR BLD AUTO: 10 % (ref 19.6–45.3)
MCH RBC QN AUTO: 27.9 PG (ref 26.6–33)
MCHC RBC AUTO-ENTMCNC: 31.7 G/DL (ref 31.5–35.7)
MCV RBC AUTO: 87.7 FL (ref 79–97)
MONOCYTES # BLD AUTO: 0.91 10*3/MM3 (ref 0.1–0.9)
MONOCYTES NFR BLD AUTO: 5 % (ref 5–12)
NEUTROPHILS NFR BLD AUTO: 15 10*3/MM3 (ref 1.7–7)
NEUTROPHILS NFR BLD AUTO: 83.2 % (ref 42.7–76)
NRBC BLD AUTO-RTO: 0 /100 WBC (ref 0–0.2)
PLATELET # BLD AUTO: 186 10*3/MM3 (ref 140–450)
PMV BLD AUTO: 9.8 FL (ref 6–12)
RBC # BLD AUTO: 3.59 10*6/MM3 (ref 3.77–5.28)
WBC NRBC COR # BLD AUTO: 18.04 10*3/MM3 (ref 3.4–10.8)

## 2024-10-15 PROCEDURE — 85025 COMPLETE CBC W/AUTO DIFF WBC: CPT | Performed by: STUDENT IN AN ORGANIZED HEALTH CARE EDUCATION/TRAINING PROGRAM

## 2024-10-15 PROCEDURE — 25010000002 KETOROLAC TROMETHAMINE PER 15 MG: Performed by: STUDENT IN AN ORGANIZED HEALTH CARE EDUCATION/TRAINING PROGRAM

## 2024-10-15 PROCEDURE — 63710000001 DIPHENHYDRAMINE PER 50 MG: Performed by: ANESTHESIOLOGY

## 2024-10-15 RX ADMIN — SENNOSIDES AND DOCUSATE SODIUM 1 TABLET: 50; 8.6 TABLET ORAL at 08:51

## 2024-10-15 RX ADMIN — KETOROLAC TROMETHAMINE 15 MG: 15 INJECTION, SOLUTION INTRAMUSCULAR; INTRAVENOUS at 08:51

## 2024-10-15 RX ADMIN — KETOROLAC TROMETHAMINE 15 MG: 15 INJECTION, SOLUTION INTRAMUSCULAR; INTRAVENOUS at 14:56

## 2024-10-15 RX ADMIN — ACETAMINOPHEN 1000 MG: 500 TABLET ORAL at 00:03

## 2024-10-15 RX ADMIN — IBUPROFEN 600 MG: 600 TABLET, FILM COATED ORAL at 22:20

## 2024-10-15 RX ADMIN — KETOROLAC TROMETHAMINE 15 MG: 15 INJECTION, SOLUTION INTRAMUSCULAR; INTRAVENOUS at 02:53

## 2024-10-15 RX ADMIN — ACETAMINOPHEN 1000 MG: 500 TABLET ORAL at 19:41

## 2024-10-15 RX ADMIN — SENNOSIDES AND DOCUSATE SODIUM 1 TABLET: 50; 8.6 TABLET ORAL at 22:21

## 2024-10-15 RX ADMIN — ACETAMINOPHEN 1000 MG: 500 TABLET ORAL at 12:17

## 2024-10-15 RX ADMIN — ACETAMINOPHEN 1000 MG: 500 TABLET ORAL at 05:47

## 2024-10-15 RX ADMIN — DIPHENHYDRAMINE HYDROCHLORIDE 25 MG: 25 CAPSULE ORAL at 05:47

## 2024-10-15 NOTE — NURSING NOTE
Pt feels need to void but unable to void. Bladder palpates distended. Straight cath 600 cc. Tolerated well.

## 2024-10-15 NOTE — LACTATION NOTE
This note was copied from a baby's chart.  Rounded on PT at this time, but she is not in the room. Will check on her again later.

## 2024-10-15 NOTE — PLAN OF CARE
Problem: Adult Inpatient Plan of Care  Goal: Plan of Care Review  Outcome: Progressing  Flowsheets (Taken 10/14/2024 2016)  Progress: improving  Outcome Evaluation:  34 and 2 came in selina and 5cm. Repeat . Delivered viable baby boy. Infant taken to NICU after delivery. Mother recovering well. Pain, vitals, and bleeding all within normal limits at this time.  Plan of Care Reviewed With: patient   Goal Outcome Evaluation:  Plan of Care Reviewed With: patient        Progress: improving  Outcome Evaluation:  34 and 2 came in eslina and 5cm. Repeat . Delivered viable baby boy. Infant taken to NICU after delivery. Mother recovering well. Pain, vitals, and bleeding all within normal limits at this time.

## 2024-10-15 NOTE — LACTATION NOTE
This note was copied from a baby's chart.  RN called LC for assist with latching for first time.  Mom has been pumping and got 15 ml last time. Educated parents on how to rouse for feeding, positioning,  and how to obtain a deep latch by starting nipple to nose.  Baby appears sleepy at this time and not showing any hunger cues.  Assisted with latching to left breast in cross cradle hold but was very sleepy.  Changed to football hold and baby awakened more and latched using a 20 mm NS.  Frequent stimulation was needed for baby to suck.  Latched for about 1-2 min and then fell asleep.  Encouraged to call LC for next latch attempt.

## 2024-10-15 NOTE — ANESTHESIA POSTPROCEDURE EVALUATION
"Patient: Angelica Pinon    Procedure Summary       Date: 10/14/24 Room / Location:  [facility] /  [facility]    Anesthesia Start:  Anesthesia Stop:     Procedure:  SECTION REPEAT (Abdomen) Diagnosis:       History of  delivery affecting pregnancy      (History of  delivery affecting pregnancy [O34.219])    Surgeons: Diana Rivera MD Provider: Neema, Albin Agarwal MD    Anesthesia Type: spinal ASA Status: 2 - Emergent            Anesthesia Type: spinal    Vitals  Vitals Value Taken Time   /64 10/14/24 2155   Temp 36.4 °C (97.5 °F) 10/14/24 2155   Pulse 73 10/14/24 2155   Resp 16 10/14/24 2058   SpO2 96 % 10/14/24 2055           Post Anesthesia Care and Evaluation    Patient location during evaluation: PACU  Patient participation: complete - patient participated  Level of consciousness: awake and alert  Pain management: adequate    Airway patency: patent  Anesthetic complications: No anesthetic complications  PONV Status: controlled  Cardiovascular status: acceptable and hemodynamically stable  Respiratory status: acceptable  Hydration status: acceptable    Comments: /64   Pulse 73   Temp 36.4 °C (97.5 °F)   Resp 16   Ht 167.6 cm (66\")   Wt 92.5 kg (204 lb)   LMP 02/10/2024 (Approximate)   SpO2 96%   Breastfeeding Unknown   BMI 32.93 kg/m²     "

## 2024-10-15 NOTE — LACTATION NOTE
This note was copied from a baby's chart.  P2, 34/2 AGA. HGP set up with instructions on modes, collection, labeling, cleaning and sterilization.  Observed pumping and encouraged pumping every 3 hours and taking ebm to NICU within one hour after pumping if possible. Pump flanges ( 24 mm) fit well and mom seems comfortable. Dad at bedside for support. Mom has PBP and Lanolin. LC number is on board for any needs.

## 2024-10-16 RX ADMIN — SENNOSIDES AND DOCUSATE SODIUM 1 TABLET: 50; 8.6 TABLET ORAL at 21:37

## 2024-10-16 RX ADMIN — IBUPROFEN 600 MG: 600 TABLET, FILM COATED ORAL at 06:06

## 2024-10-16 RX ADMIN — ACETAMINOPHEN 325MG 650 MG: 325 TABLET ORAL at 02:13

## 2024-10-16 RX ADMIN — ACETAMINOPHEN 325MG 650 MG: 325 TABLET ORAL at 15:18

## 2024-10-16 RX ADMIN — IBUPROFEN 600 MG: 600 TABLET, FILM COATED ORAL at 12:32

## 2024-10-16 RX ADMIN — IBUPROFEN 600 MG: 600 TABLET, FILM COATED ORAL at 18:14

## 2024-10-16 RX ADMIN — ACETAMINOPHEN 325MG 650 MG: 325 TABLET ORAL at 09:27

## 2024-10-16 RX ADMIN — ACETAMINOPHEN 325MG 650 MG: 325 TABLET ORAL at 21:37

## 2024-10-16 RX ADMIN — IBUPROFEN 600 MG: 600 TABLET, FILM COATED ORAL at 23:56

## 2024-10-16 NOTE — PROGRESS NOTES
[facility]   PROGRESS NOTE    Post-Op Day 1 S/P   Subjective     Patient reports:  Pain is well controlled . She is ambulating. Tolerating diet. Tolerating po -- normal.  Intake -- c/o of tolerating po solids.   Voiding - without difficulty; flatus reported..  Vaginal bleeding is light. She notes her son is stable in NICU.     ROS:  Pulm: neg for soa  CV: neg for chest pain  : neg for heavy bleeding  Musculoskeletal: neg for leg pain    Objective      Vitals: Vital Signs Range for the last 24 hours  Temperature: Temp:  [98.2 °F (36.8 °C)-98.3 °F (36.8 °C)] 98.2 °F (36.8 °C)   Temp Source: Temp src: Oral   BP: BP: (114-121)/(73-82) 118/73   Pulse: Heart Rate:  [76-90] 85   Respirations: Resp:  [16] 16   SPO2: SpO2:  [100 %] 100 %   O2 Amount (l/min):     O2 Devices Device (Oxygen Therapy): room air   Weight:              Physical Exam    Lungs Normal respiratory effort   Abdomen Soft, fundus firm at U-1 and nontender   Incision  no drainage, no erythema, no swelling, well approximated   Extremities Bilateral lower ext with trace edema, no cords or tenderness     Labs:  Lab Results   Component Value Date    WBC 18.04 (H) 10/15/2024    HGB 10.0 (L) 10/15/2024    HCT 31.5 (L) 10/15/2024    MCV 87.7 10/15/2024     10/15/2024       Assessment & Plan        History of  delivery affecting pregnancy    Asthma    Didelphic uterus    History of intrauterine growth restriction in prior pregnancy, currently pregnant in third trimester     labor in third trimester without delivery    Pregnancy    Hx of preeclampsia, prior pregnancy, currently pregnant     delivery delivered      Assessment:    Angelica Pinon is Day 1  post-partum  , Low Transverse : pt is stable and denies any current issues.      Plan:  continue post op care and ambulation encouraged .        Janina Odell MD  10/16/2024  09:15 EDT

## 2024-10-16 NOTE — LACTATION NOTE
This note was copied from a baby's chart.  Called to NICU to assist with bf. Baby did take a few sucks with NS in place earlier today for first bf attempt. He was alert and quiet but did have strong suck on my finger.   20 mm NS placed and baby positioned in FB hold. He did suck a few times but was reluctant to latch fully until SNS placed with NS. RN at BS with SNS and with mom and LC holding infant. After SNS started baby had a much stronger suck and mom felt stronger tugging. He was able to take 10 ml of HDM over 10 minutes and tolerated well. He burped twice and was placed in STS on mom. Parents were happy he did so well and look forward to the next feeding. Encouraged mom to continue pumping every 3 hours and to call for any assistance.

## 2024-10-16 NOTE — PLAN OF CARE
Problem: Adult Inpatient Plan of Care  Goal: Plan of Care Review  Outcome: Progressing  Flowsheets (Taken 10/16/2024 1108)  Outcome Evaluation: vss, assessment wnl, pain well controlled with motrin and tylenol, fundus and lochia wnl, pt is ambulating and voiding, baby is in NICU, mom is pumping and taking milk to NICU  Goal: Patient-Specific Goal (Individualized)  Outcome: Progressing  Goal: Absence of Hospital-Acquired Illness or Injury  Outcome: Progressing  Intervention: Identify and Manage Fall Risk  Recent Flowsheet Documentation  Taken 10/16/2024 1030 by Rita Avilez, RN  Safety Promotion/Fall Prevention: patient off unit  Taken 10/16/2024 0810 by Rita Avilez, RN  Safety Promotion/Fall Prevention: safety round/check completed  Goal: Optimal Comfort and Wellbeing  Outcome: Progressing  Intervention: Provide Person-Centered Care  Recent Flowsheet Documentation  Taken 10/16/2024 0810 by Rita Avilez, RN  Trust Relationship/Rapport:   care explained   thoughts/feelings acknowledged  Goal: Readiness for Transition of Care  Outcome: Progressing     Problem: Skin Injury Risk Increased  Goal: Skin Health and Integrity  Outcome: Progressing  Intervention: Optimize Skin Protection  Recent Flowsheet Documentation  Taken 10/16/2024 0810 by Rita Avilez, RN  Activity Management: up ad parul   Goal Outcome Evaluation:              Outcome Evaluation: vss, assessment wnl, pain well controlled with motrin and tylenol, fundus and lochia wnl, pt is ambulating and voiding, baby is in NICU, mom is pumping and taking milk to NICU

## 2024-10-16 NOTE — PROGRESS NOTES
"Discharge Planning Assessment  [facility]     Patient Name: Angelica Pinon  MRN: [id number]  Today's Date: 10/16/2024    Admit Date: 10/14/2024    Plan: Infant may discharge to mother when medically ready. DIEGO Hussein.   Discharge Needs Assessment    No documentation.                  Discharge Plan       Row Name 10/16/24 1353       Plan    Plan Infant may discharge to mother when medically ready. DIEGO Hussein.    Plan Comments Mother: Angelica Pinon \"Latoya\", MRN: [id number]; infant: Shelia \"Alvarez\" Zi, MRN: [id number]. CSW was not consulted but saw mother for \"NICU admission.\" Of note, no toxicology screens were ordered for mother or infant as need was not warranted at this time. CSW met with mother alone at bedside. Mother verified address, phone number, and insurance. Mother reports she understands the process of adding infant to health insurance. Mother reports having a car seat, crib/bassinet, clothes, and diapers for infant. Mother and father have one other child: 3yr old, who is being cared for by maternal & paternal grandparents during hospital stay. Mother reports, maternal grandparents, paternal grandparents, father of infant/, and other family members are available for support as needed. Mother reports infant is following up with Dr. Mora after discharge; mother is comfortable scheduling appointments for infant and has reliable transportation. Mother is not current with [facility] but is familiar with the program. Mother denies any violence, threats, or feeling unsafe at home or relationship. CSW explained her role as NICU  and encouraged mother to reach out if needed. Mother became tearful and spoke about feeling anxious and sad discharging from the hospital without infant, and how it's been hard that her other son cannot meet his brother yet. CSW provide active listening and validation to mother. CSW asked if mother could Facetime the grandparents, so her son could \"meet\" " his brother to a certain extent. Mother stated, she had not thought of that and planned on making that happen. Mother thanked CSW. CSW provided mother with a packet of resources including: WIC, HANDS, transportation, infant supplies, counseling, online support groups, postpartum mood and anxiety resources, NICU parent resources, and general community resources. CSW spent time building rapport with mother, and offered validation, support, and encouragement to mother throughout assessment. Mother was polite and appropriate, and denied having unmet needs or concerns at this time. CSW will remain available for psychosocial needs while infant is in the NICU. DIEGO Hussein.                  Continued Care and Services - Admitted Since 10/14/2024    No active coordination exists for this encounter.       Expected Discharge Date and Time       Expected Discharge Date Expected Discharge Time    Oct 17, 2024            Demographic Summary       Row Name 10/16/24 1353       General Information    Admission Type inpatient    Arrived From home    Reason for Consult other (see comments)    General Information Comments NICU admission                   Functional Status       Row Name 10/16/24 1353       Functional Status, IADL    Medications independent    Meal Preparation independent    Housekeeping independent    Laundry independent    Shopping independent       Mental Status    General Appearance WDL WDL       Mental Status Summary    Recent Changes in Mental Status/Cognitive Functioning no changes                   Psychosocial       Row Name 10/16/24 1353       Behavior WDL    Behavior WDL WDL       Emotion Mood WDL    Emotion/Mood/Affect WDL WDL       Speech WDL    Speech WDL WDL       Perceptual State WDL    Perceptual State WDL WDL       Thought Process WDL    Thought Process WDL WDL       Intellectual Performance WDL    Intellectual Performance WDL WDL       Coping/Stress    Major Change/Loss/Stressor birth    Patient  Personal Strengths future/goal oriented;motivated;positive attitude;strong support system    Sources of Support other family members;parent(s);spouse                   Abuse/Neglect       Row Name 10/16/24 0746       Personal Safety    Feels Unsafe at Home or Work/School no    Feels Threatened by Someone no    Does Anyone Try to Keep You From Having Contact with Others or Doing Things Outside Your Home? no    Physical Signs of Abuse Present no                   Legal    No documentation.                  Substance Abuse    No documentation.                  Patient Forms    No documentation.                     ONUR Child

## 2024-10-17 RX ADMIN — SENNOSIDES AND DOCUSATE SODIUM 1 TABLET: 50; 8.6 TABLET ORAL at 09:07

## 2024-10-17 RX ADMIN — ACETAMINOPHEN 325MG 650 MG: 325 TABLET ORAL at 21:53

## 2024-10-17 RX ADMIN — ACETAMINOPHEN 325MG 650 MG: 325 TABLET ORAL at 03:03

## 2024-10-17 RX ADMIN — IBUPROFEN 600 MG: 600 TABLET, FILM COATED ORAL at 06:26

## 2024-10-17 RX ADMIN — SENNOSIDES AND DOCUSATE SODIUM 1 TABLET: 50; 8.6 TABLET ORAL at 20:41

## 2024-10-17 RX ADMIN — ACETAMINOPHEN 325MG 650 MG: 325 TABLET ORAL at 09:07

## 2024-10-17 RX ADMIN — IBUPROFEN 600 MG: 600 TABLET, FILM COATED ORAL at 18:16

## 2024-10-17 RX ADMIN — IBUPROFEN 600 MG: 600 TABLET, FILM COATED ORAL at 12:02

## 2024-10-17 RX ADMIN — ACETAMINOPHEN 325MG 650 MG: 325 TABLET ORAL at 15:22

## 2024-10-17 NOTE — PLAN OF CARE
Goal Outcome Evaluation:                                          Problem: Adult Inpatient Plan of Care  Goal: Plan of Care Review  Outcome: Progressing  Flowsheets (Taken 10/17/2024 0710)  Progress: improving  Outcome Evaluation: VSS, pain controlled with eras meds, ambulating to NICU to see baby, breastfeeding baby and pumping when needed  Plan of Care Reviewed With:   patient   spouse  Goal: Patient-Specific Goal (Individualized)  Outcome: Progressing  Goal: Absence of Hospital-Acquired Illness or Injury  Outcome: Progressing  Intervention: Identify and Manage Fall Risk  Recent Flowsheet Documentation  Taken 10/17/2024 0626 by Nell Patel RN  Safety Promotion/Fall Prevention: safety round/check completed  Taken 10/17/2024 0400 by Nell Patel RN  Safety Promotion/Fall Prevention: safety round/check completed  Taken 10/17/2024 0303 by Nell Patel RN  Safety Promotion/Fall Prevention: safety round/check completed  Taken 10/17/2024 0045 by Nell Patel RN  Safety Promotion/Fall Prevention: safety round/check completed  Taken 10/16/2024 2356 by Nell Patel RN  Safety Promotion/Fall Prevention: safety round/check completed  Taken 10/16/2024 2225 by Nell Patel RN  Safety Promotion/Fall Prevention: safety round/check completed  Taken 10/16/2024 2137 by Nell Patel RN  Safety Promotion/Fall Prevention: safety round/check completed  Taken 10/16/2024 2015 by Nell Patel RN  Safety Promotion/Fall Prevention: (in NICU) patient off unit  Goal: Optimal Comfort and Wellbeing  Outcome: Progressing  Intervention: Monitor Pain and Promote Comfort  Recent Flowsheet Documentation  Taken 10/17/2024 0626 by Nell Patel RN  Pain Management Interventions: pain medication given  Taken 10/17/2024 0303 by Nell Patel RN  Pain Management Interventions: pain medication given  Taken 10/16/2024 2356 by  Nell Patel, RN  Pain Management Interventions:   pain medication given   cold applied  Taken 10/16/2024 2137 by Nell Patel, RN  Pain Management Interventions: pain medication given  Intervention: Provide Person-Centered Care  Recent Flowsheet Documentation  Taken 10/16/2024 2137 by Nell Patel, RN  Trust Relationship/Rapport:   care explained   choices provided   questions encouraged  Goal: Readiness for Transition of Care  Outcome: Progressing

## 2024-10-17 NOTE — PLAN OF CARE
Goal Outcome Evaluation:                 VSS, up ad parul and voiding well.  Fundus firm, lochia light.  Pain well controlled with ERAS medication.  Incision clean,dry,intact.

## 2024-10-17 NOTE — PROGRESS NOTES
[facility]   Obstetrics and Gynecology     10/17/2024    Name:Angelica Pinon    MR#:[id number]     Progress Note:  Post-Op    HD:3    Subjective   34 y.o. yo Female  s/p CS at 34w2d doing well. Pain well controlled. Tolerating regular diet and having flatus. Lochia normal.     Patient Active Problem List   Diagnosis    Asthma    History of  delivery affecting pregnancy    Didelphic uterus    History of intrauterine growth restriction in prior pregnancy, currently pregnant in third trimester     labor in third trimester without delivery    Pregnancy    Hx of preeclampsia, prior pregnancy, currently pregnant     delivery delivered        Objective    Vitals  Temp:  Temp:  [97.4 °F (36.3 °C)-97.8 °F (36.6 °C)] 97.4 °F (36.3 °C)  Temp src: Oral  BP:  BP: (110-114)/(70-76) 110/74  Pulse:  Heart Rate:  [77-98] 98  RR:   Resp:  [16] 16  Weight: 92.5 kg (204 lb)  BMI:  Body mass index is 32.93 kg/m².    Physical Exam  Constitutional:       General: She is not in acute distress.     Appearance: Normal appearance.   Abdominal:      General: Abdomen is flat.      Palpations: Abdomen is soft.   Genitourinary:     Comments: Incisions are dry clean and intact without evidence of any infection    Neurological:      Mental Status: She is alert.         I/O last 3 completed shifts:  In: -   Out: 1000 [Urine:1000]    LABS:  Results from last 7 days   Lab Units 10/15/24  0547 10/14/24  1658   WBC 10*3/mm3 18.04* 16.21*   HEMOGLOBIN g/dL 10.0* 11.7*   HEMATOCRIT % 31.5* 36.9   PLATELETS 10*3/mm3 186 225           Infant: male      Assessment    1.  POD#2     History of  delivery affecting pregnancy    Asthma    Didelphic uterus    History of intrauterine growth restriction in prior pregnancy, currently pregnant in third trimester     labor in third trimester without delivery    Pregnancy    Hx of preeclampsia, prior pregnancy, currently pregnant      delivery delivered      Plan:  Continue routine postoperative care   Infant in the NICU    Fred Pinon MD  10/17/2024 09:16 EDT

## 2024-10-18 VITALS
TEMPERATURE: 98 F | HEART RATE: 86 BPM | SYSTOLIC BLOOD PRESSURE: 124 MMHG | RESPIRATION RATE: 16 BRPM | DIASTOLIC BLOOD PRESSURE: 84 MMHG | WEIGHT: 204 LBS | BODY MASS INDEX: 32.78 KG/M2 | HEIGHT: 66 IN | OXYGEN SATURATION: 100 %

## 2024-10-18 RX ORDER — IBUPROFEN 600 MG/1
600 TABLET, FILM COATED ORAL EVERY 6 HOURS
Qty: 30 TABLET | Refills: 0 | Status: SHIPPED | OUTPATIENT
Start: 2024-10-18

## 2024-10-18 RX ADMIN — IBUPROFEN 600 MG: 600 TABLET, FILM COATED ORAL at 07:57

## 2024-10-18 RX ADMIN — ACETAMINOPHEN 325MG 650 MG: 325 TABLET ORAL at 14:05

## 2024-10-18 RX ADMIN — ACETAMINOPHEN 325MG 650 MG: 325 TABLET ORAL at 10:55

## 2024-10-18 RX ADMIN — ACETAMINOPHEN 325MG 650 MG: 325 TABLET ORAL at 04:04

## 2024-10-18 RX ADMIN — IBUPROFEN 600 MG: 600 TABLET, FILM COATED ORAL at 01:20

## 2024-10-18 RX ADMIN — SENNOSIDES AND DOCUSATE SODIUM 1 TABLET: 50; 8.6 TABLET ORAL at 07:57

## 2024-10-18 NOTE — DISCHARGE SUMMARY
[facility]  Delivery Discharge Summary    Discharge Diagnosis:      labor  2.  Repeat  section      Patient Active Problem List   Diagnosis    Asthma    History of  delivery affecting pregnancy    Didelphic uterus    History of intrauterine growth restriction in prior pregnancy, currently pregnant in third trimester     labor in third trimester without delivery    Pregnancy    Hx of preeclampsia, prior pregnancy, currently pregnant     delivery delivered        Primary OB Clinician: Nicole    EDC: Estimated Date of Delivery: 24    Gestational Age:34w2d    Antepartum complications: premature labor with tocolysis    Date of Delivery: 10/14/2024  Time of Delivery: 5:58 PM    Delivered By:  Andreia Chaves [place]    Delivery Type: , Low Transverse     Tubal Ligation: n/a    Baby: male infant;   Apgar:  7  @ 1 minute /   Apgar:  9  @ 5 minutes        Anesthesia: Spinal     Intrapartum complications:  Labor    Laceration: No    Episiotomy: No    Placenta: Manual removal    Feeding method: Breastfeeding Status: Yes    Rh Immune globulin given: not applicable    Rubella vaccine given: no    Discharge Date: 10/18/2024; Discharge Time: 11:02 EDT    Early Discharge:  NO  Hospital Course:  The patient was admitted following delivery.  She did well postpartum with normal return of bowel function and remained afebrile.    Blood type: O+  Rubella immune  dTap given prenatally        Plan:    Follow-up appointment with Dr Rivera in 1 week.

## 2024-10-18 NOTE — NURSING NOTE
VSS for rest of night. Up and walking independently. Visiting baby in NICU. Showered this shift. Voiding spontaneously. Pumping for baby. No concerns or other updates at this time

## 2024-10-18 NOTE — PROGRESS NOTES
" POSTPARTUM ROUNDS    CC: POD 4 from CS    SUBJECTIVE:  Pain is controlled. The patient is tolerating a regular diet. She is ambulating. Her lochia is normal.     ROS: mild cramping and incision pain  No N/V, no F/C, no leg pain, noHA    OBJECTIVE:  Vital Signs: /84 (BP Location: Left arm, Patient Position: Lying)   Pulse 86   Temp 98 °F (36.7 °C) (Oral)   Resp 16   Ht 167.6 cm (66\")   Wt 92.5 kg (204 lb)   LMP 02/10/2024 (Approximate)   SpO2 100%   Breastfeeding Unknown   BMI 32.93 kg/m²   No intake or output data in the 24 hours ending 10/18/24 1059    Constitutional: The patient is well nourished.  Cardiovascular:RRR  Resp: nonlabored breathing  The incision is clean, dry and Intact  Gastrointestinal: fundus firm below umbilicus  Extremities:no edema, non-tender bilaterally    LABS / IMAGING:  Lab Results (last 24 hours)       ** No results found for the last 24 hours. **            ASSESSMENT AND PLAN:    Patient Active Problem List   Diagnosis    Asthma    History of  delivery affecting pregnancy    Didelphic uterus    History of intrauterine growth restriction in prior pregnancy, currently pregnant in third trimester     labor in third trimester without delivery    Pregnancy    Hx of preeclampsia, prior pregnancy, currently pregnant     delivery delivered         Plan:    Patient is postop day 4 from LTCS  Patient is doing well  Ready for Discharge  Plans to board as baby is in NICU                 Mackenzie Cope MD    10/18/2024  10:59 EDT       "

## 2024-10-18 NOTE — LACTATION NOTE
This note was copied from a baby's chart.  Mom reports breast feeding baby some and she continues to pump getting up to 45mls.   She breast fed her first baby x 1yr.  Encouraged to call for any questions or concerns.

## 2024-10-18 NOTE — PLAN OF CARE
Goal Outcome Evaluation:      VSS. Pain controlled with PO meds. Fundus and lochia within normal limits. Ambulating independently. Voiding spontaneously. Pumping and feeding baby in NICU. No concerns at this time

## 2024-10-19 ENCOUNTER — LACTATION ENCOUNTER (OUTPATIENT)
Dept: NURSERY | Facility: HOSPITAL | Age: 34
End: 2024-10-19

## 2024-10-19 NOTE — LACTATION NOTE
This note was copied from a baby's chart.  Mom's left breast is engorged, education on engorgement given and ice packs provided.

## 2024-10-21 ENCOUNTER — LACTATION ENCOUNTER (OUTPATIENT)
Dept: NURSERY | Facility: HOSPITAL | Age: 34
End: 2024-10-21

## 2024-10-22 NOTE — LACTATION NOTE
This note was copied from a baby's chart.  RN called for NS. Baby is now 35 /2 and is having some difficulty staying awake for feeds. She noted that he was on phototherapy until today. Reviewed that he may be sleepy for several different reasons but to use suck training and remove clothing if temp is stable. He did have a strong suck with assessment and latched with 20 mm NS in place. He quickly fell asleep and released breast with only 2-4 minutes of sucking. Swallowing heard and strong tugging felt by mom. She is pumping with her HGP after all feeds and is expressing around 90 ml between both breasts.She denies nipple pain but says baby prefers the right breast. Encouraged expressing milk before attempting to latch him to non preferred breast, change feeding positions and pump extra as needed if full.  LC number given so they could call for help if needed.

## 2024-10-24 ENCOUNTER — POSTPARTUM VISIT (OUTPATIENT)
Dept: OBSTETRICS AND GYNECOLOGY | Age: 34
End: 2024-10-24
Payer: COMMERCIAL

## 2024-10-24 VITALS — BODY MASS INDEX: 32.94 KG/M2 | HEIGHT: 66 IN | DIASTOLIC BLOOD PRESSURE: 82 MMHG | SYSTOLIC BLOOD PRESSURE: 134 MMHG

## 2024-10-24 NOTE — ASSESSMENT & PLAN NOTE
Patient progressing appropriately in postpartum period.  Incision healing well.  Mood is labile given stress of infant in NICU.  EPDS is 12.  Reports good support with .  Discussed operative findings of extensive scar tissue and possible contribution to pain on right side given that this was the side of her pregnancy.  Return precautions reviewed.

## 2024-10-24 NOTE — PROGRESS NOTES
[facility]   Obstetrics and Gynecology   Postpartum Visit    10/24/2024    Patient: Angelica Pinon          MR#:[id number]    History of Present Illness    Chief Complaint   Patient presents with    Postpartum Care     Pain in right side       34 y.o. female  who is 10 days status post rLTCS at 34w2d for PPROM who presents for incision check.  Tearful today-reports confusion over where her appointment was and was expecting to see Dr. Rivera.  Also understandably feeling stressed as infant remains in NICU.  Overall doing well but is having some difficulty feeding.  Breast-feeding without issue.  Bleeding is decreasing and minimal.  No intercourse since delivery.  Undecided on postpartum contraception.  She is having some deeper pain on the right side of her incision.      Retired EPD Scale: Thought of Harming Self: 0-->never  The thought of harming myself has occurred to me.: 0  Retired [place]  Depression Score: 12  [place]  Depression Scale Total: 10    Contraception: undecided  Vaccines: VI/RI  Pap: 2022 NILM neg HPV  ________________________________________  Patient Active Problem List   Diagnosis    Asthma    History of  delivery affecting pregnancy    Didelphic uterus    History of intrauterine growth restriction in prior pregnancy, currently pregnant in third trimester     labor in third trimester without delivery    Pregnancy    Hx of preeclampsia, prior pregnancy, currently pregnant     delivery delivered       Past Medical History:   Diagnosis Date    Anxiety     Asthma     Depression Last year    I took medicine and changed eating/exercise habits and am off meds and feeling better now    Didelphic uterus     Headache     Intrauterine growth restriction (IUGR) affecting care of mother     Migraine     Overweight (BMI 25.0-29.9) 10/28/2023    Pregnancy 10/9/2024    Urinary tract infection     Varicella        Past Surgical History:  "  Procedure Laterality Date     SECTION       SECTION N/A 10/14/2024    Procedure:  SECTION REPEAT;  Surgeon: Diana Rivera MD;  Location: [facility] LABOR DELIVERY;  Service: Obstetrics;  Laterality: N/A;    WISDOM TOOTH EXTRACTION         Social History     Tobacco Use   Smoking Status Never   Smokeless Tobacco Never     has a current medication list which includes the following prescription(s): ibuprofen and prenatal vitamin 28-0.8.  ________________________________________         Review of Systems   Constitutional:  Negative for chills and fever.   Gastrointestinal:  Negative for nausea and vomiting.     Objective     /82   Ht 167.6 cm (65.98\")   LMP 02/10/2024 (Approximate)   Breastfeeding Yes   BMI 32.94 kg/m²    BP Readings from Last 3 Encounters:   10/24/24 134/82   10/18/24 124/84   10/12/24 108/71      Wt Readings from Last 3 Encounters:   10/14/24 92.5 kg (204 lb)   24 92.5 kg (204 lb)   24 91.9 kg (202 lb 9.6 oz)      BMI: Estimated body mass index is 32.94 kg/m² as calculated from the following:    Height as of this encounter: 167.6 cm (65.98\").    Weight as of 10/14/24: 92.5 kg (204 lb).    EXAM     General:     Patient appears well in NAD  Respiratory: Normal effort, no distress  Abdomen: Soft, NT, incision healing well without evidence of erythema or exudate  Pelvic:  deferred  Ext:  No cyanosis or edema    Assessment:  34 y.o. female  who is 10 days status post rLTCS at 34w2d for PPROM who presents for incision check.     Diagnoses and all orders for this visit:    1.  delivery delivered (Primary)  Assessment & Plan:  Patient progressing appropriately in postpartum period.  Incision healing well.  Mood is labile given stress of infant in NICU.  EPDS is 12.  Reports good support with .        Plan:  For routine postpartum visit in approximately 4 weeks    Lucy Acuña MD  10/24/2024 15:28 EDT    "

## 2024-10-28 ENCOUNTER — MATERNAL SCREENING (OUTPATIENT)
Dept: CALL CENTER | Facility: HOSPITAL | Age: 34
End: 2024-10-28
Payer: COMMERCIAL

## 2024-10-28 NOTE — OUTREACH NOTE
Maternal Screening Survey      Flowsheet Row Responses   [facility]   [facility]? Yes   Call start time 1035   Call end time 1038   I have been able to laugh and see the funny side of things. 0   I have looked forward with enjoyment to things. 0   I have blamed myself unnecessarily when things went wrong. 2   I have been anxious or worried for no good reason. 0   I have felt scared or panicky for no good reason. 0   Things have been getting on top of me. 1   I have been so unhappy that I have had difficulty sleeping. 0   I have felt sad or miserable. 1   I have been so unhappy that I have been crying. 0   The thought of harming myself has occurred to me. 0   [place]  Depression Scale Total 4   Did any of your parents have problems with alcohol or drug use? No   Do any of your peers have problems with alcohol or drug use? No   Does your partner have problems with alcohol or drug use? No   Before you were pregnant did you have problems with alcohol or drug use? (past) No   In the past month, did you drink beer, wine, liquor or use any other drugs? (pregnancy) No   Maternal Screening call completed Yes   Call end time 1038              Lea BECERRA - Registered Nurse

## 2024-11-22 ENCOUNTER — POSTPARTUM VISIT (OUTPATIENT)
Dept: OBSTETRICS AND GYNECOLOGY | Age: 34
End: 2024-11-22
Payer: COMMERCIAL

## 2024-11-22 VITALS
DIASTOLIC BLOOD PRESSURE: 82 MMHG | WEIGHT: 192.2 LBS | BODY MASS INDEX: 30.89 KG/M2 | HEIGHT: 66 IN | SYSTOLIC BLOOD PRESSURE: 124 MMHG

## 2024-11-22 DIAGNOSIS — Z30.011 ENCOUNTER FOR INITIAL PRESCRIPTION OF CONTRACEPTIVE PILLS: ICD-10-CM

## 2024-11-22 NOTE — PROGRESS NOTES
[facility]   Obstetrics and Gynecology   Postpartum Visit    2024    Patient: Angelica Pinon          MR#:[id number]    History of Present Illness    Chief Complaint   Patient presents with    Postpartum Care     Cc: postpartum delivered 10/14/24 via  laureen 5lb 9oz. Breast feeding.       34 y.o. female  status post  delivery presents for postpartum visit without complaints.    Breast-feeding without issue.  Bleeding has resolved.  No intercourse since delivery.  Having some difficulties with mood that has persisted and is somewhat tearful in the office today.    Contraception: OCPs  Vaccines: up to date  Pap: up to date  ________________________________________  Patient Active Problem List   Diagnosis    Asthma    History of  delivery affecting pregnancy    Didelphic uterus    History of intrauterine growth restriction in prior pregnancy, currently pregnant in third trimester     labor in third trimester without delivery    Pregnancy    Hx of preeclampsia, prior pregnancy, currently pregnant     delivery delivered       Past Medical History:   Diagnosis Date    Anxiety     Asthma     Depression Last year    I took medicine and changed eating/exercise habits and am off meds and feeling better now    Didelphic uterus     Headache     Intrauterine growth restriction (IUGR) affecting care of mother     Migraine     Overweight (BMI 25.0-29.9) 10/28/2023    Pregnancy 10/9/2024    Urinary tract infection     Varicella        Past Surgical History:   Procedure Laterality Date     SECTION       SECTION N/A 10/14/2024    Procedure:  SECTION REPEAT;  Surgeon: Diana Rivera MD;  Location: [facility] LABOR DELIVERY;  Service: Obstetrics;  Laterality: N/A;    WISDOM TOOTH EXTRACTION         Social History     Tobacco Use   Smoking Status Never   Smokeless Tobacco Never       has a current medication list which includes the following  "prescription(s): prenatal vitamin 28-0.8, ibuprofen, norethindrone-ethinyl estradiol-ferrous fumarate, and sertraline.  ________________________________________         Review of Systems   All other systems reviewed and are negative.      Objective     /82   Ht 167 cm (65.75\")   Wt 87.2 kg (192 lb 3.2 oz)   Breastfeeding Yes   BMI 31.26 kg/m²    BP Readings from Last 3 Encounters:   11/22/24 124/82   10/24/24 134/82   10/18/24 124/84      Wt Readings from Last 3 Encounters:   11/22/24 87.2 kg (192 lb 3.2 oz)   10/14/24 92.5 kg (204 lb)   09/27/24 92.5 kg (204 lb)      BMI: Estimated body mass index is 31.26 kg/m² as calculated from the following:    Height as of this encounter: 167 cm (65.75\").    Weight as of this encounter: 87.2 kg (192 lb 3.2 oz).    EXAM     General:     Patient appears well in NAD  Respiratory: Normal effort, no distress  Breast:  declined  Abdomen: Soft, NT, no acute findings  Pelvic:  Scant lochia, perineum without signs of infection, uterus small and nontender  Ext:  No cyanosis or edema    Assessment:    Diagnoses and all orders for this visit:    1. Postpartum depression (Primary)  Comments:  Discussed that normal period for PP blues is about 2 weeks.   Patient would like to try treatment at this time.   Zoloft 50mg sent to pharmacy.  Orders:  -     sertraline (Zoloft) 50 MG tablet; Take 1 tablet by mouth Daily.  Dispense: 90 tablet; Refill: 3    2. Encounter for initial prescription of contraceptive pills  -     norethindrone-ethinyl estradiol-ferrous fumarate (LOESTIN 24 FE) 1-20 MG-MCG(24) per tablet; Take 1 tablet by mouth Daily.  Dispense: 28 tablet; Refill: 12    3. Postpartum follow-up  Comments:  Incision healing well.   OCPs for contraception >  planning on vasectomy.   Start Zoloft for mood.   f/u in 6 weeks.        Plan:  Return in about 6 weeks (around 1/3/2025).      Diana Rivera MD  11/22/2024 13:56 EST    "

## 2024-11-27 DIAGNOSIS — Z30.011 ENCOUNTER FOR INITIAL PRESCRIPTION OF CONTRACEPTIVE PILLS: Primary | ICD-10-CM

## 2024-11-27 RX ORDER — ACETAMINOPHEN AND CODEINE PHOSPHATE 120; 12 MG/5ML; MG/5ML
1 SOLUTION ORAL DAILY
Qty: 28 TABLET | Refills: 12 | Status: SHIPPED | OUTPATIENT
Start: 2024-11-27 | End: 2025-11-27

## 2024-12-15 ENCOUNTER — E-VISIT (OUTPATIENT)
Dept: FAMILY MEDICINE CLINIC | Facility: TELEHEALTH | Age: 34
End: 2024-12-15
Payer: COMMERCIAL

## 2024-12-15 NOTE — E-VISIT ESCALATED
Date: 12/15/2024 11:07:23  Clinician: Ana Maria Jose  Clinician NPI: [id number]  Patient: Angelica Pinon  Patient : 1990  Patient Address: [address] [place] [address]  Patient Phone: [phone]  Visit Protocol: Mastitis  Patient Summary:  Angelica is a 34 year old ( : 1990 ) female who initiated a visit to be evaluated for mastitis.    Angelica uploaded images of the affected breast(s).   Angelica has been breastfeeding and/or pumping breast milk for 3 months or   less.   Symptom details  The symptoms started 12-24 hours ago. Both breasts are affected. Angelica is experiencing tenderness and pain on or around the breast. The color of the affected area is same color as unaffected area. Angelica also reports a   decrease in milk supply, myalgia, and malaise.    Breast pain: The breast pain is moderate (4-6 on a 10 point pain scale). The painful area does not have a pocket of fluid trapped under the skin (tender fluctuant area).    Angelica denies fever, nipple   cracking, a bleb at the end of the nipple, chills, discharge from the breast, breast warmth, breast swelling, and thickening of the skin or lump on the breast at the area of pain.   Pertinent medical history  Angelica has not been diagnosed with mastitis   in the past.   Angelica denies any MRSA risk factors.   Angelica denies having immunosuppressive conditions (e.g., chemotherapy, HIV, organ transplant, long-term use of steroids or other immunosuppressive medications, splenectomy).   Angelica does not get   yeast infections when an antibiotic is taken.   Angelica does not smoke or use smokeless tobacco. Angelica does not vape or use other e-cigarette products.   Angelica denies pregnancy.   Additional information as reported by the patient (free text): I am   not sure what else to add.     MEDICATIONS: nifedipine oral, potassium chloride oral, nifedipine oral, acetaminophen oral, norethindrone (contraceptive) oral, sertraline oral, flu  vaccine trivalent 2024-25 (6mos up)(PF) intramuscular, terbutaline subcutaneous, ibuprofen oral,   ketorolac injection, ketorolac injection, acetaminophen oral, morphine injection, sodium citrate-citric acid oral, nifedipine oral, ALLERGIES: NKDA  Clinician Response:  Dear Angelica,  Your health is our priority. To determine the most appropriate care for you, I would like you to be seen in person to further discuss your health history.  You will not be charged for this visit. Thank you for trusting   us with your care.   Diagnosis: Refer for additional evaluation  Diagnosis ICD: R69

## 2024-12-17 ENCOUNTER — TELEPHONE (OUTPATIENT)
Dept: OBSTETRICS AND GYNECOLOGY | Age: 34
End: 2024-12-17
Payer: COMMERCIAL

## 2024-12-17 NOTE — TELEPHONE ENCOUNTER
PP PT 10/14 states she is still breastfeeding. PT left breast feels like she has a burning sensation and it does hurt to breastfeed on that side. PT states it is not hot to touch but she does not want to wait too long to be seen. The only thing I have on [place]'s schedule is a potential 10AM tomorrow with Jaci Akins or she would have to wait to be seen on Friday by Dr. Rivera. Please advise if I can use the patient care slot for Jaci tomorrow or if PT can wait until Friday. PT does not really want to drive to [place].   Nicole PT

## 2024-12-18 ENCOUNTER — OFFICE VISIT (OUTPATIENT)
Dept: OBSTETRICS AND GYNECOLOGY | Age: 34
End: 2024-12-18
Payer: COMMERCIAL

## 2024-12-18 VITALS
DIASTOLIC BLOOD PRESSURE: 76 MMHG | SYSTOLIC BLOOD PRESSURE: 118 MMHG | HEIGHT: 66 IN | BODY MASS INDEX: 30.37 KG/M2 | WEIGHT: 189 LBS

## 2024-12-18 DIAGNOSIS — N64.59 BREAST ENGORGEMENT: Primary | ICD-10-CM

## 2024-12-18 NOTE — PROGRESS NOTES
"Subjective     History of Present Illness  Angelica Pinon is a 34 y.o.  female is being seen today for   Chief Complaint   Patient presents with    Gynecologic Exam     9wk PP-  10/14/2024 baby boy, c/o breast pain & burning sensation     C/o breast pain x 5 days.  Patient is 9 weeks postpartum  delivery, breast-feeding.  She pumps sometimes and uses a shield for latching.  Bilateral breast began having a burning sensation with feeding and after that started over the weekend, about 5 days ago.  No redness or lumps bilaterally.  No fevers.  Not taking zoloft, states she feels good. Denies SI or self-harm. EPDS score today is a 6.  Not doing birth control at this time.  Colitis interruptus.  Patient reports she did not have pelvic exam completed at last postpartum visit, will complete pelvic exam today.    The following portions of the patient's history were reviewed and updated as appropriate: allergies, current medications, past family history, past medical history, past social history, past surgical history and problem list.    /76   Ht 167 cm (65.75\")   Wt 85.7 kg (189 lb)   Breastfeeding Yes   BMI 30.74 kg/m²         Review of Systems   Constitutional: Negative.    HENT: Negative.     Eyes: Negative.    Respiratory: Negative.     Cardiovascular: Negative.    Gastrointestinal: Negative.    Endocrine: Negative.    Genitourinary: Negative.    Musculoskeletal:         +breast pain   Skin: Negative.    Allergic/Immunologic: Negative.    Neurological: Negative.    Hematological: Negative.    Psychiatric/Behavioral: Negative.         Objective   Physical Exam  Constitutional:       General: She is not in acute distress.  Cardiovascular:      Rate and Rhythm: Normal rate and regular rhythm.      Pulses: Normal pulses.      Heart sounds: Normal heart sounds.   Pulmonary:      Effort: Pulmonary effort is normal.      Breath sounds: Normal breath sounds.   Chest:      Comments: Area of " firmness in upper outer quadrant of left breast.  Patient reports no tenderness.  No warmth or erythema.  Right breast exam normal.  Bilateral nipples normal.  Genitourinary:     Exam position: Lithotomy position.      Labia:         Right: No rash, tenderness or lesion.         Left: No rash, tenderness or lesion.       Vagina: Normal.      Cervix: Normal.      Uterus: Normal.       Adnexa: Right adnexa normal and left adnexa normal.   Neurological:      Mental Status: She is alert and oriented to person, place, and time.   Psychiatric:         Mood and Affect: Mood normal.         Behavior: Behavior normal.         Thought Content: Thought content normal.         Judgment: Judgment normal.           Assessment & Plan   Diagnoses and all orders for this visit:    1. Breast engorgement (Primary)    -Reviewed physical exam findings with patient, some breast engorgement on left breast.  Continue breast-feeding.  Call if she begins to have any fevers, tenderness, or redness.  If these occur will call antibiotics in for mastitis.  No signs of infection today.    All questions answered. Patient verbalizes understanding and is agreeable to plan.  Return if symptoms worsen or fail to improve, for *can change next appt to annual*.

## 2025-06-07 DIAGNOSIS — Z30.011 ENCOUNTER FOR INITIAL PRESCRIPTION OF CONTRACEPTIVE PILLS: ICD-10-CM

## 2025-06-09 RX ORDER — ACETAMINOPHEN AND CODEINE PHOSPHATE 120; 12 MG/5ML; MG/5ML
1 SOLUTION ORAL DAILY
Qty: 28 TABLET | Refills: 12 | Status: SHIPPED | OUTPATIENT
Start: 2025-06-09 | End: 2026-06-09

## (undated) DEVICE — SUT MONOCRYL PLS 3/0 CT1 UD/MF 90CM MCP944H

## (undated) DEVICE — 3M™ TEGADERM™ TRANSPARENT FILM DRESSING FRAME STYLE, 1627, 4 IN X 10 IN (10 CM X 25 CM), 20/CT 4CT/CASE: Brand: 3M™ TEGADERM™

## (undated) DEVICE — SOL IRR NACL 0.9PCT BT 1000ML

## (undated) DEVICE — GLV SURG BIOGEL LTX PF 6

## (undated) DEVICE — SUT VIC 0 CTX 36IN J978H

## (undated) DEVICE — SUT MNCRYL 0/0 CTX 36IN Y398H

## (undated) DEVICE — SPONGE,LAP,18"X18",XR,ST,5/TRAY: Brand: MEDLINE

## (undated) DEVICE — SUT MNCRYL PLS ANTIB UD 4/0 PS2 18IN

## (undated) DEVICE — SLV SCD CALF HEMOFORCE DVT THERP REPROC MD